# Patient Record
Sex: MALE | Race: WHITE | NOT HISPANIC OR LATINO | Employment: OTHER | URBAN - METROPOLITAN AREA
[De-identification: names, ages, dates, MRNs, and addresses within clinical notes are randomized per-mention and may not be internally consistent; named-entity substitution may affect disease eponyms.]

---

## 2017-02-22 ENCOUNTER — COMMUNICATION - HEALTHEAST (OUTPATIENT)
Dept: INTERNAL MEDICINE | Facility: CLINIC | Age: 82
End: 2017-02-22

## 2017-03-07 ENCOUNTER — COMMUNICATION - HEALTHEAST (OUTPATIENT)
Dept: INTERNAL MEDICINE | Facility: CLINIC | Age: 82
End: 2017-03-07

## 2017-03-07 DIAGNOSIS — E03.9 HYPOTHYROIDISM: ICD-10-CM

## 2017-03-08 ENCOUNTER — OFFICE VISIT - HEALTHEAST (OUTPATIENT)
Dept: INTERNAL MEDICINE | Facility: CLINIC | Age: 82
End: 2017-03-08

## 2017-03-08 DIAGNOSIS — I10 ESSENTIAL HYPERTENSION WITH GOAL BLOOD PRESSURE LESS THAN 140/90: ICD-10-CM

## 2017-03-08 DIAGNOSIS — R05.9 COUGH: ICD-10-CM

## 2017-03-08 DIAGNOSIS — R19.7 DIARRHEA: ICD-10-CM

## 2017-03-13 ENCOUNTER — AMBULATORY - HEALTHEAST (OUTPATIENT)
Dept: INTERNAL MEDICINE | Facility: CLINIC | Age: 82
End: 2017-03-13

## 2017-03-13 ENCOUNTER — COMMUNICATION - HEALTHEAST (OUTPATIENT)
Dept: INTERNAL MEDICINE | Facility: CLINIC | Age: 82
End: 2017-03-13

## 2017-03-31 ENCOUNTER — COMMUNICATION - HEALTHEAST (OUTPATIENT)
Dept: INTERNAL MEDICINE | Facility: CLINIC | Age: 82
End: 2017-03-31

## 2017-07-29 ENCOUNTER — RECORDS - HEALTHEAST (OUTPATIENT)
Dept: ADMINISTRATIVE | Facility: OTHER | Age: 82
End: 2017-07-29

## 2017-09-23 ENCOUNTER — RECORDS - HEALTHEAST (OUTPATIENT)
Dept: ADMINISTRATIVE | Facility: OTHER | Age: 82
End: 2017-09-23

## 2017-11-13 ENCOUNTER — AMBULATORY - HEALTHEAST (OUTPATIENT)
Dept: NURSING | Facility: CLINIC | Age: 82
End: 2017-11-13

## 2017-11-20 ENCOUNTER — OFFICE VISIT - HEALTHEAST (OUTPATIENT)
Dept: INTERNAL MEDICINE | Facility: CLINIC | Age: 82
End: 2017-11-20

## 2017-11-20 DIAGNOSIS — J06.9 URI (UPPER RESPIRATORY INFECTION): ICD-10-CM

## 2017-12-08 ENCOUNTER — RECORDS - HEALTHEAST (OUTPATIENT)
Dept: ADMINISTRATIVE | Facility: OTHER | Age: 82
End: 2017-12-08

## 2018-01-02 ENCOUNTER — RECORDS - HEALTHEAST (OUTPATIENT)
Dept: ADMINISTRATIVE | Facility: OTHER | Age: 83
End: 2018-01-02

## 2018-02-02 ENCOUNTER — RECORDS - HEALTHEAST (OUTPATIENT)
Dept: ADMINISTRATIVE | Facility: OTHER | Age: 83
End: 2018-02-02

## 2018-02-05 ENCOUNTER — RECORDS - HEALTHEAST (OUTPATIENT)
Dept: ADMINISTRATIVE | Facility: OTHER | Age: 83
End: 2018-02-05

## 2018-03-02 ENCOUNTER — COMMUNICATION - HEALTHEAST (OUTPATIENT)
Dept: INTERNAL MEDICINE | Facility: CLINIC | Age: 83
End: 2018-03-02

## 2018-03-02 DIAGNOSIS — E03.9 HYPOTHYROIDISM: ICD-10-CM

## 2018-03-30 ENCOUNTER — RECORDS - HEALTHEAST (OUTPATIENT)
Dept: ADMINISTRATIVE | Facility: OTHER | Age: 83
End: 2018-03-30

## 2018-05-04 ENCOUNTER — COMMUNICATION - HEALTHEAST (OUTPATIENT)
Dept: INTERNAL MEDICINE | Facility: CLINIC | Age: 83
End: 2018-05-04

## 2018-05-04 DIAGNOSIS — E03.9 HYPOTHYROIDISM: ICD-10-CM

## 2018-05-07 ENCOUNTER — COMMUNICATION - HEALTHEAST (OUTPATIENT)
Dept: INTERNAL MEDICINE | Facility: CLINIC | Age: 83
End: 2018-05-07

## 2018-05-20 ENCOUNTER — COMMUNICATION - HEALTHEAST (OUTPATIENT)
Dept: INTERNAL MEDICINE | Facility: CLINIC | Age: 83
End: 2018-05-20

## 2018-05-20 DIAGNOSIS — I25.10 CORONARY ARTERY DISEASE: ICD-10-CM

## 2018-05-20 DIAGNOSIS — I10 ESSENTIAL HYPERTENSION: ICD-10-CM

## 2018-05-20 DIAGNOSIS — I49.01 CARDIAC ARREST WITH VENTRICULAR FIBRILLATION (H): ICD-10-CM

## 2018-05-20 DIAGNOSIS — I46.9 CARDIAC ARREST WITH VENTRICULAR FIBRILLATION (H): ICD-10-CM

## 2018-07-06 ENCOUNTER — COMMUNICATION - HEALTHEAST (OUTPATIENT)
Dept: INTERNAL MEDICINE | Facility: CLINIC | Age: 83
End: 2018-07-06

## 2018-07-06 DIAGNOSIS — E03.9 HYPOTHYROIDISM: ICD-10-CM

## 2018-07-16 ENCOUNTER — OFFICE VISIT (OUTPATIENT)
Dept: FAMILY MEDICINE | Facility: OTHER | Age: 83
End: 2018-07-16
Attending: NURSE PRACTITIONER
Payer: MEDICARE

## 2018-07-16 VITALS
BODY MASS INDEX: 28.47 KG/M2 | SYSTOLIC BLOOD PRESSURE: 140 MMHG | TEMPERATURE: 97.6 F | DIASTOLIC BLOOD PRESSURE: 84 MMHG | WEIGHT: 204 LBS | HEART RATE: 64 BPM

## 2018-07-16 DIAGNOSIS — S51.012A SKIN TEAR OF LEFT ELBOW WITHOUT COMPLICATION, INITIAL ENCOUNTER: Primary | ICD-10-CM

## 2018-07-16 PROCEDURE — G0463 HOSPITAL OUTPT CLINIC VISIT: HCPCS

## 2018-07-16 PROCEDURE — 99202 OFFICE O/P NEW SF 15 MIN: CPT | Performed by: NURSE PRACTITIONER

## 2018-07-16 RX ORDER — METOPROLOL SUCCINATE 25 MG
TABLET, EXTENDED RELEASE 24 HR ORAL
COMMUNITY
Start: 2018-05-20 | End: 2024-01-01

## 2018-07-16 RX ORDER — CLOPIDOGREL BISULFATE 75 MG/1
TABLET ORAL
COMMUNITY
Start: 2017-08-31 | End: 2019-05-21

## 2018-07-16 RX ORDER — ATORVASTATIN CALCIUM 40 MG/1
TABLET, FILM COATED ORAL
COMMUNITY
Start: 2018-06-28 | End: 2021-05-29

## 2018-07-16 NOTE — MR AVS SNAPSHOT
After Visit Summary   7/16/2018    Wilmer Shankar    MRN: [id number]           Patient Information     Date Of Birth          12/22/1933        Visit Information        Provider Department      7/16/2018 7:15 PM Ijeoma Rose NP [facility]        Care Instructions      Skin Tear (Skin Avulsion)  A skin avulsion is a tearing of the top layer of skin. This commonly happens after a fall or other injury. It also tends to be more common in older people, or those taking blood thinners or steroids for long periods of time.  Home care  These guidelines will help you care for your wound at home:    Keep the wound clean and dry for the first 24 to 48 hours, or as your healthcare provider advises.    If there is a dressing or bandage, change it when it gets wet or dirty. Otherwise, leave it on for the first 24 hours, then change it once a day or as often as the doctor says.    If stitches or staples were used, check the wound every day.    After taking off the dressing, wash the area gently with soap and water. Clean as close to the stitches as you can. Avoid washing or rubbing the stitches directly.    After 3 days you can keep the bandages off the wound, unless told otherwise, or there is continued drainage.  Allow the wound to be open to the air.    Keep a thin layer of antibiotic ointment on the cut. This will keep the wound clean, make it easier to remove the stitches, and reduce scarring.    If your wound is oozing, you can put a nonstick dressing over it. Then, reapply the bandage or dressing as you were told.    You can shower as usual after the first 24 hours, but don't soak the area in water (no baths or swimming) until the stitches or staples are taken out.    If surgical tape was used, keep the area clean and dry. If it becomes wet, blot it dry with a clean towel.    If skin glue was used, don't put any creams, lotions, or antibiotic ointments on it. These can dissolve the  glue. Usually the glue will flake off in about 5 to 10 days by itself. Try to resist picking it off before that so the wound doesn't open up. When it gets wet, pat it dry.  Here is some information about medicine:    You may use over-the-counter medicine such as acetaminophen or ibuprofen to control pain, unless another pain medicine was given. If you have chronic liver or kidney disease or ever had a stomach ulcer or gastrointestinal bleeding, talk with your doctor before using these medicines.    If you were given antibiotics, take them until they are all used up. It is important to finish the antibiotics even if the wound looks better. This will ensure that the infection has cleared.  Follow-up care  Follow up with your healthcare provider, or as advised.    Watch for any signs of infection, such as increasing redness, swelling, or pus coming out. If this happens, don't wait for your scheduled visit. Instead, see a doctor sooner.    Stitches or staples are usually taken out within 5 to 14 days. This varies depending on what part of your body they are on, and the type of wound. The doctor will tell you how long stitches should be left in.     If surgical tape was used, it is usually left on for 7 to 10 days. You can remove surgical tape after that unless you were told otherwise. If you try to remove it, and it is too hard, soaking can help. Surgical tape strips will eventually fall off on their own. If the edges of the cut pull apart, stop removing the tape or strips and follow up with your doctor    As mentioned above, skin glue will flake off by itself in 5 to 10 days, so you don't need to pull it off.  If any X-rays were done, you will be notified of any changes that may affect your care.  When to seek medical advice  Call your healthcare provider right away if any of these occur:    Increasing pain in the wound    Redness, swelling, or pus coming from the wound    Fever of 100.4 F (38 C) or higher, or as  "directed by your healthcare provider    Sutures or staples come apart or fall out before your next appointment and the wound edges look as if they will re-open    Surgical tape closures fall off before 7 days, and the wound edges look as if they will re-open    Bleeding not controlled by direct pressure                  Follow-ups after your visit        Who to contact     If you have questions or need follow up information about today's clinic visit or your schedule please contact [facility] directly at [phone].  Normal or non-critical lab and imaging results will be communicated to you by [employer], letter or phone within 4 business days after the clinic has received the results. If you do not hear from us within 7 days, please contact the clinic through Share[employer] or phone. If you have a critical or abnormal lab result, we will notify you by phone as soon as possible.  Submit refill requests through [employer] or call your pharmacy and they will forward the refill request to us. Please allow 3 business days for your refill to be completed.          Additional Information About Your Visit        [employer] Information     [employer] lets you send messages to your doctor, view your test results, renew your prescriptions, schedule appointments and more. To sign up, go to [email or web address] . Click on \"Log in\" on the left side of the screen, which will take you to the Welcome page. Then click on \"Sign up Now\" on the right side of the page.     You will be asked to enter the access code listed below, as well as some personal information. Please follow the directions to create your username and password.     Your access code is: [email or web address]  Expires: 10/14/2018  8:34 PM     Your access code will  in 90 days. If you need help or a new code, please call your [place] clinic or [phone].        Care EveryWhere ID     This is your Care EveryWhere ID. This could be used by other organizations to " access your [place] medical records  [id number]        Your Vitals Were     Pulse Temperature BMI (Body Mass Index)             64 97.6  F (36.4  C) (Tympanic) 28.47 kg/m2          Blood Pressure from Last 3 Encounters:   07/16/18 140/84   05/25/12 128/71    Weight from Last 3 Encounters:   07/16/18 204 lb (92.5 kg)   10/25/16 196 lb (88.9 kg)   09/28/16 196 lb (88.9 kg)              Today, you had the following     No orders found for display       Primary Care Provider Office Phone # Fax #    Reji Plata [phone]       [address]   [place] [address]        Equal Access to Services     AZRA ZHU : Hadii aad ku hadasho Soomaali, waaxda luqadaha, qaybta kaalmada adeegyada, waxay yovani garcia. So [facility] [phone].    ATENCIÓN: Si habla español, tiene a luna disposición servicios gratuitos de asistencia lingüística. Ll[facility] [phone].    We comply with applicable federal civil rights laws and [place] laws. We do not discriminate on the basis of race, color, national origin, age, disability, sex, sexual orientation, or gender identity.            Thank you!     Thank you for choosing [facility]  for your care. Our goal is always to provide you with excellent care. Hearing back from our patients is one way we can continue to improve our services. Please take a few minutes to complete the written survey that you may receive in the mail after your visit with us. Thank you!             Your Updated Medication List - Protect others around you: Learn how to safely use, store and throw away your medicines at www.disposemymeds.org.          This list is accurate as of 7/16/18  8:34 PM.  Always use your most recent med list.                   Brand Name Dispense Instructions for use Diagnosis    ALLEGRA PO      Take  by mouth.        ASPIRIN PO      Take  by mouth.        atorvastatin 40 MG tablet    LIPITOR          calcium  carbonate-vitamin D 600-400 MG-UNIT Chew      Take  by mouth.        clopidogrel 75 MG tablet    PLAVIX          LEVOTHYROXINE SODIUM PO      Take  by mouth.        lisinopril-hydrochlorothiazide 10-12.5 MG per tablet    PRINZIDE/ZESTORETIC     Take 1 tablet by mouth daily.        SIMVASTATIN PO      Take  by mouth.        TOPROL XL 25 MG 24 hr tablet   Generic drug:  metoprolol succinate           VIAGRA PO      Take  by mouth.        VITAMIN C PO      Take  by mouth.        VITAMIN D3 PO      Take 5,000 Units by mouth daily.

## 2018-07-17 NOTE — NURSING NOTE
Patient presents to the clinic for scrape on left arm. Patient reports falling on cement steps last night. Patient's wife reports his left knee is bothering him as well. Patient states he has been seeing Dr. Celaya for his knee.  Suzette JACKSON CMA.......7/16/2018..8:00 PM

## 2018-07-17 NOTE — PATIENT INSTRUCTIONS
Skin Tear (Skin Avulsion)  A skin avulsion is a tearing of the top layer of skin. This commonly happens after a fall or other injury. It also tends to be more common in older people, or those taking blood thinners or steroids for long periods of time.  Home care  These guidelines will help you care for your wound at home:    Keep the wound clean and dry for the first 24 to 48 hours, or as your healthcare provider advises.    If there is a dressing or bandage, change it when it gets wet or dirty. Otherwise, leave it on for the first 24 hours, then change it once a day or as often as the doctor says.    If stitches or staples were used, check the wound every day.    After taking off the dressing, wash the area gently with soap and water. Clean as close to the stitches as you can. Avoid washing or rubbing the stitches directly.    After 3 days you can keep the bandages off the wound, unless told otherwise, or there is continued drainage.  Allow the wound to be open to the air.    Keep a thin layer of antibiotic ointment on the cut. This will keep the wound clean, make it easier to remove the stitches, and reduce scarring.    If your wound is oozing, you can put a nonstick dressing over it. Then, reapply the bandage or dressing as you were told.    You can shower as usual after the first 24 hours, but don't soak the area in water (no baths or swimming) until the stitches or staples are taken out.    If surgical tape was used, keep the area clean and dry. If it becomes wet, blot it dry with a clean towel.    If skin glue was used, don't put any creams, lotions, or antibiotic ointments on it. These can dissolve the glue. Usually the glue will flake off in about 5 to 10 days by itself. Try to resist picking it off before that so the wound doesn't open up. When it gets wet, pat it dry.  Here is some information about medicine:    You may use over-the-counter medicine such as acetaminophen or ibuprofen to control pain,  unless another pain medicine was given. If you have chronic liver or kidney disease or ever had a stomach ulcer or gastrointestinal bleeding, talk with your doctor before using these medicines.    If you were given antibiotics, take them until they are all used up. It is important to finish the antibiotics even if the wound looks better. This will ensure that the infection has cleared.  Follow-up care  Follow up with your healthcare provider, or as advised.    Watch for any signs of infection, such as increasing redness, swelling, or pus coming out. If this happens, don't wait for your scheduled visit. Instead, see a doctor sooner.    Stitches or staples are usually taken out within 5 to 14 days. This varies depending on what part of your body they are on, and the type of wound. The doctor will tell you how long stitches should be left in.     If surgical tape was used, it is usually left on for 7 to 10 days. You can remove surgical tape after that unless you were told otherwise. If you try to remove it, and it is too hard, soaking can help. Surgical tape strips will eventually fall off on their own. If the edges of the cut pull apart, stop removing the tape or strips and follow up with your doctor    As mentioned above, skin glue will flake off by itself in 5 to 10 days, so you don't need to pull it off.  If any X-rays were done, you will be notified of any changes that may affect your care.  When to seek medical advice  Call your healthcare provider right away if any of these occur:    Increasing pain in the wound    Redness, swelling, or pus coming from the wound    Fever of 100.4 F (38 C) or higher, or as directed by your healthcare provider    Sutures or staples come apart or fall out before your next appointment and the wound edges look as if they will re-open    Surgical tape closures fall off before 7 days, and the wound edges look as if they will re-open    Bleeding not controlled by direct  pressure

## 2018-07-17 NOTE — PROGRESS NOTES
Nursing Notes:   Suzette Kelly CMA  7/16/2018  8:02 PM  Unsigned  Patient presents to the clinic for scrape on left arm. Patient reports falling on cement steps last night. Patient's wife reports his left knee is bothering him as well. Patient states he has been seeing Dr. Celaya for his knee.  Suzette JACKSON CMA.......7/16/2018..8:00 PM      SUBJECTIVE:   Wilmer Shankar is a 84 year old male who presents to clinic today for the following health issues:    Skin tear:       Duration: Last night    Description (location/character/radiation): scrap, fell and scrapped LT arm    Intensity:  mild    Accompanying signs and symptoms: No other concerns.     History (similar episodes/previous evaluation): None    Precipitating or alleviating factors: washed it and dressed it    Therapies tried and outcome: Cleaned it at home.        Problem list and histories reviewed & adjusted, as indicated.  Additional history: as documented    Current Outpatient Prescriptions   Medication Sig Dispense Refill     Ascorbic Acid (VITAMIN C PO) Take  by mouth.       ASPIRIN PO Take  by mouth.       atorvastatin (LIPITOR) 40 MG tablet        calcium carbonate-vitamin D 600-400 MG-UNIT CHEW Take  by mouth.       Cholecalciferol (VITAMIN D3 PO) Take 5,000 Units by mouth daily.       clopidogrel (PLAVIX) 75 MG tablet        Fexofenadine HCl (ALLEGRA PO) Take  by mouth.       LEVOTHYROXINE SODIUM PO Take  by mouth.       lisinopril-hydrochlorothiazide (PRINZIDE,ZESTORETIC) 10-12.5 MG per tablet Take 1 tablet by mouth daily.       Sildenafil Citrate (VIAGRA PO) Take  by mouth.       SIMVASTATIN PO Take  by mouth.       TOPROL XL 25 MG 24 hr tablet        No Known Allergies      ROS:  Notable findings in the HPI.       OBJECTIVE:     /84 (BP Location: Right arm, Patient Position: Sitting, Cuff Size: Adult Regular)  Pulse 64  Temp 97.6  F (36.4  C) (Tympanic)  Wt 204 lb (92.5 kg)  BMI 28.47 kg/m2  Body mass index is 28.47 kg/(m^2).  GENERAL:  healthy, alert and no distress  RESP: with ease  SKIN: 3 cm by 2 cm skin tear noted on the LT elbow. This is cleaned and skin approximated again. Dressing applied. ROM of elbow intact.   Wife wanted his knee looked at, he did not want it looked at.     Diagnostic Test Results:  none     ASSESSMENT/PLAN:     1. Skin tear of left elbow without complication, initial encounter      PLAN:  Dressing applied, leave on for 24 hours and then change dressings as needed. Home cares and otc gone over.       Followup:    If not improving or if condition worsens, follow up with your Primary Care Provider    Disclaimer:  This note consists of words and symbols derived from keyboarding, dictation, or using voice recognition software. As a result, there may be errors in the script that have gone undetected. Please consider this when interpreting information found in this note.      Ijeoma Rose NP, 7/16/2018 8:05 PM

## 2018-07-18 ENCOUNTER — RECORDS - HEALTHEAST (OUTPATIENT)
Dept: ADMINISTRATIVE | Facility: OTHER | Age: 83
End: 2018-07-18

## 2018-07-18 ENCOUNTER — HOSPITAL ENCOUNTER (EMERGENCY)
Facility: OTHER | Age: 83
Discharge: HOME | End: 2018-07-18
Attending: FAMILY MEDICINE | Admitting: FAMILY MEDICINE
Payer: MEDICARE

## 2018-07-18 VITALS
HEART RATE: 60 BPM | BODY MASS INDEX: 28.56 KG/M2 | TEMPERATURE: 98.4 F | WEIGHT: 204 LBS | RESPIRATION RATE: 14 BRPM | DIASTOLIC BLOOD PRESSURE: 93 MMHG | OXYGEN SATURATION: 93 % | SYSTOLIC BLOOD PRESSURE: 163 MMHG | HEIGHT: 71 IN

## 2018-07-18 DIAGNOSIS — I10 HYPERTENSION, UNSPECIFIED TYPE: ICD-10-CM

## 2018-07-18 LAB
ANION GAP SERPL CALCULATED.3IONS-SCNC: 10 MMOL/L (ref 3–14)
BUN SERPL-MCNC: 28 MG/DL (ref 7–25)
CALCIUM SERPL-MCNC: 9.2 MG/DL (ref 8.6–10.3)
CHLORIDE SERPL-SCNC: 111 MMOL/L (ref 98–107)
CO2 SERPL-SCNC: 20 MMOL/L (ref 21–31)
CREAT SERPL-MCNC: 1.6 MG/DL (ref 0.7–1.3)
GFR SERPL CREATININE-BSD FRML MDRD: 41 ML/MIN/1.7M2
GLUCOSE SERPL-MCNC: 98 MG/DL (ref 70–105)
POTASSIUM SERPL-SCNC: 4.1 MMOL/L (ref 3.5–5.1)
SODIUM SERPL-SCNC: 141 MMOL/L (ref 134–144)
TROPONIN I SERPL-MCNC: <0.03 UG/L (ref 0–0.03)

## 2018-07-18 PROCEDURE — 36415 COLL VENOUS BLD VENIPUNCTURE: CPT | Performed by: FAMILY MEDICINE

## 2018-07-18 PROCEDURE — 93010 ELECTROCARDIOGRAM REPORT: CPT | Performed by: INTERNAL MEDICINE

## 2018-07-18 PROCEDURE — 80048 BASIC METABOLIC PNL TOTAL CA: CPT | Performed by: FAMILY MEDICINE

## 2018-07-18 PROCEDURE — 99284 EMERGENCY DEPT VISIT MOD MDM: CPT | Mod: 25 | Performed by: FAMILY MEDICINE

## 2018-07-18 PROCEDURE — 93005 ELECTROCARDIOGRAM TRACING: CPT | Performed by: FAMILY MEDICINE

## 2018-07-18 PROCEDURE — 84484 ASSAY OF TROPONIN QUANT: CPT | Performed by: FAMILY MEDICINE

## 2018-07-18 PROCEDURE — 99284 EMERGENCY DEPT VISIT MOD MDM: CPT | Mod: Z6 | Performed by: FAMILY MEDICINE

## 2018-07-18 NOTE — DISCHARGE INSTRUCTIONS
Taking Your Blood Pressure  Blood pressure is the force of blood against the artery wall as it moves from the heart through the blood vessels. You can take your own blood pressure reading using a digital monitor. Take your readings the same each time, using the same arm. Take readings as often as your healthcare provider instructs.  About blood pressure monitors  Blood pressure monitors are designed for certain ages and cases. You can find monitors for older adults, for pregnant women, and for children. Make sure the one you choose is the right one for your age and situation.  The American Heart Association recommends an automatic cuff monitor that fits on your upper arm (bicep). The cuff should fit your arm size. A cuff that s too large or too small will not give an accurate reading. Measure around your upper arm to find your size.  Monitors that attach to your finger or wrist are not as accurate as monitors for your upper arm.  Ask your healthcare provider for help in choosing a monitor. Bring your monitor to your next provider visit if you need help in using it the correct way.  The steps below are general instructions for using an automatic digital monitor.  Step 1. Relax      Take your blood pressure at the same time every day, such as in the morning or evening, or at the time your healthcare provider recommends.    Wait at least a half-hour after smoking, eating, or exercising. Don't drink coffee, tea, soda, or other caffeinated beverages before checking your blood pressure.    Sit comfortably at a table with both feet on the floor. Do not cross your legs or feet. Place the monitor near you.    Rest for a few minutes before you begin.  Step 2. Wrap the cuff      Place your arm on the table, palm up. Your arm should be at the level of your heart. Wrap the cuff around your upper arm, just above your elbow. It s best done on bare skin, not over clothing. Most cuffs will indicate where the brachial artery (the  blood vessel in the middle of the arm at the inner side of the elbow) should line up with the cuff. Look in your monitor's instruction booklet for an illustration. You can also bring your cuff to your healthcare provider and have them show you how to correctly place the cuff.  Step 3. Inflate the cuff      Push the button that starts the pump.    The cuff will tighten, then loosen.    The numbers will change. When they stop changing, your blood pressure reading will appear.    Take 2 or 3 readings one minute apart.  Step 4. Write down the results of each reading      Write down your blood pressure numbers for each reading. Note the date and time. Keep your results in one place, such as a notebook. Even if your monitor has a built-in memory, keep a hard copy of the readings.    Remove the cuff from your arm. Turn off the machine.    Bring your blood pressure records with your healthcare providers at each visit.    If you start a new blood pressure medicine, note the day you started the new medicine. Also note the day if you change the dose of your medicine. This information goes on your blood pressure recording sheet. This will help your healthcare provider monitor how well the medicine changes are working.    Ask your healthcare provider what numbers should prompt you to call him or her. Also ask what numbers should prompt you to get help right away.  Date Last Reviewed: 11/1/2016 2000-2017 [employer]. [address] [place] [address], [place] [address]. All rights reserved. This information is not intended as a substitute for professional medical care. Always follow your healthcare professional's instructions.

## 2018-07-18 NOTE — ED PROVIDER NOTES
History   No chief complaint on file.    HPI  Wilmer Shankar is a 84 year old male who has had some elevated blood pressures at home over the past couple of days, at times over 200 systolic.  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  I called his cardiology clinic and they state his pacemaker is set at 30 on the low end, thus this is why it is not pacing here with pulses in the low 40s.  Mostly, though his pulse is in the 50s.      He describes feeling slightly dizzy one time this am.  It was associated with head movements, and was spinning in nature.  Only lasted a few seconds.    Not associated with palpitations or chest symptoms.    He is entirely asymptomatic at this time and for most of the day.      Problem List:    There are no active problems to display for this patient.       Past Medical History:    Past Medical History:   Diagnosis Date     High blood cholesterol level      Hypertension      Thyroid disease        Past Surgical History:    Past Surgical History:   Procedure Laterality Date     HERNIA REPAIR       ORTHOPEDIC SURGERY      right knee replacement       Family History:    Family History   Problem Relation Age of Onset     Cancer - colorectal Brother        Social History:  Marital Status:   [2]  Social History   Substance Use Topics     Smoking status: Former Smoker     Smokeless tobacco: Never Used     Alcohol use Yes      Comment: modeerate        Medications:      ASPIRIN PO   atorvastatin (LIPITOR) 40 MG tablet   LEVOTHYROXINE SODIUM PO   lisinopril-hydrochlorothiazide (PRINZIDE,ZESTORETIC) 10-12.5 MG per tablet   SIMVASTATIN PO   TOPROL XL 25 MG 24 hr tablet   Ascorbic Acid (VITAMIN C PO)   calcium carbonate-vitamin D 600-400 MG-UNIT CHEW   Cholecalciferol (VITAMIN D3 PO)   clopidogrel (PLAVIX) 75 MG tablet   Fexofenadine HCl (ALLEGRA PO)   Sildenafil Citrate (VIAGRA PO)         Review of Systemsno  "fevers, chills, HEENT, chest pain or pressure, SOB, abdominal concerns, N/V/D.    Physical Exam   BP: 143/83  Pulse: 60  Temp: 98.4  F (36.9  C)  Resp: 16  Height: 180.3 cm (5' 11\")  Weight: 92.5 kg (204 lb)  SpO2: 97 %      Physical Examwell man.  Stable vitals.  Pulses in the 40s and 50s, but he is asymptomatic and not subjectively orthostatic.  PERRL.  EOMI.  CN intact and tested.  No obvious one-sided neurologic deficits.  TMs clear.  Heart reg.  No murmur, rub, gallop.  Lungs clear.  abd soft, NT.    ED Course     ED Course   his labs are reassuring.  Cr is at its baseline of around 1.5    EKG shows normal sinus with RBBB, and no concerning interval changes  Procedures               Critical Care time:  none               Results for orders placed or performed during the hospital encounter of 07/18/18 (from the past 24 hour(s))   Troponin I   Result Value Ref Range    Troponin I ES <0.030 0.000 - 0.034 ug/L   Basic metabolic panel   Result Value Ref Range    Sodium 141 134 - 144 mmol/L    Potassium 4.1 3.5 - 5.1 mmol/L    Chloride 111 (H) 98 - 107 mmol/L    Carbon Dioxide 20 (L) 21 - 31 mmol/L    Anion Gap 10 3 - 14 mmol/L    Glucose 98 70 - 105 mg/dL    Urea Nitrogen 28 (H) 7 - 25 mg/dL    Creatinine 1.60 (H) 0.70 - 1.30 mg/dL    GFR Estimate 41 (L) >60 mL/min/1.7m2    GFR Estimate If Black 50 (L) >60 mL/min/1.7m2    Calcium 9.2 8.6 - 10.3 mg/dL       Medications - No data to display    Assessments & Plan (with Medical Decision Making)     I have reviewed the nursing notes.    I have reviewed the findings, diagnosis, plan and need for follow up with the patient.   the patient is asymptomatic, has stable BPs, and no evidence of end-organ damage.  Would recommend he talk to his primary tomorrow for ER follow up and take daily BPs at home.    New Prescriptions    No medications on file       Final diagnoses:   Hypertension, unspecified type       7/18/2018   [facility]     Rishabh Talley " MD [facility]  07/18/18 [id number]

## 2018-07-18 NOTE — ED AVS SNAPSHOT
[facility]    [address]    [place] [address]    Phone:  [phone]    Fax:  [phone]                                       Wilmer Shankar   MRN: [id number]    Department:  [facility]   Date of Visit:  7/18/2018           After Visit Summary Signature Page     I have received my discharge instructions, and my questions have been answered. I have discussed any challenges I see with this plan with the nurse or doctor.    ..........................................................................................................................................  Patient/Patient Representative Signature      ..........................................................................................................................................  Patient Representative Print Name and Relationship to Patient    ..................................................               ................................................  Date                                            Time    ..........................................................................................................................................  Reviewed by Signature/Title    ...................................................              ..............................................  Date                                                            Time

## 2018-07-18 NOTE — ED TRIAGE NOTES
Pt has history of fall for a few days ago. Pt fell today from extreme dizziness and felt like the room was spinning. Pt's BP was high into the 200's at home. Pt's BP is normal 120's. Pt bumped his head no LOC. Pt was on plavix but states he is no longer on it.    Christina Guerrero RN on 7/18/2018 at 12:18 PM

## 2018-07-19 ENCOUNTER — COMMUNICATION - HEALTHEAST (OUTPATIENT)
Dept: SCHEDULING | Facility: CLINIC | Age: 83
End: 2018-07-19

## 2018-09-10 ENCOUNTER — COMMUNICATION - HEALTHEAST (OUTPATIENT)
Dept: INTERNAL MEDICINE | Facility: CLINIC | Age: 83
End: 2018-09-10

## 2018-09-10 DIAGNOSIS — E03.9 HYPOTHYROIDISM: ICD-10-CM

## 2018-09-11 ENCOUNTER — COMMUNICATION - HEALTHEAST (OUTPATIENT)
Dept: SCHEDULING | Facility: CLINIC | Age: 83
End: 2018-09-11

## 2018-09-11 DIAGNOSIS — E03.9 HYPOTHYROIDISM: ICD-10-CM

## 2018-11-13 ENCOUNTER — RECORDS - HEALTHEAST (OUTPATIENT)
Dept: ADMINISTRATIVE | Facility: OTHER | Age: 83
End: 2018-11-13

## 2018-11-26 ENCOUNTER — RECORDS - HEALTHEAST (OUTPATIENT)
Dept: ADMINISTRATIVE | Facility: OTHER | Age: 83
End: 2018-11-26

## 2018-11-30 ENCOUNTER — RECORDS - HEALTHEAST (OUTPATIENT)
Dept: LAB | Facility: CLINIC | Age: 83
End: 2018-11-30

## 2018-12-03 LAB
ANION GAP SERPL CALCULATED.3IONS-SCNC: 11 MMOL/L (ref 5–18)
BUN SERPL-MCNC: 25 MG/DL (ref 8–28)
CALCIUM SERPL-MCNC: 8.9 MG/DL (ref 8.5–10.5)
CHLORIDE BLD-SCNC: 116 MMOL/L (ref 98–107)
CO2 SERPL-SCNC: 15 MMOL/L (ref 22–31)
CREAT SERPL-MCNC: 1.62 MG/DL (ref 0.7–1.3)
GFR SERPL CREATININE-BSD FRML MDRD: 41 ML/MIN/1.73M2
GLUCOSE BLD-MCNC: 64 MG/DL (ref 70–125)
POTASSIUM BLD-SCNC: 4.2 MMOL/L (ref 3.5–5)
SODIUM SERPL-SCNC: 142 MMOL/L (ref 136–145)

## 2018-12-10 ENCOUNTER — RECORDS - HEALTHEAST (OUTPATIENT)
Dept: LAB | Facility: CLINIC | Age: 83
End: 2018-12-10

## 2018-12-10 LAB
ANION GAP SERPL CALCULATED.3IONS-SCNC: 8 MMOL/L (ref 5–18)
BUN SERPL-MCNC: 25 MG/DL (ref 8–28)
CALCIUM SERPL-MCNC: 8.8 MG/DL (ref 8.5–10.5)
CHLORIDE BLD-SCNC: 114 MMOL/L (ref 98–107)
CO2 SERPL-SCNC: 20 MMOL/L (ref 22–31)
CREAT SERPL-MCNC: 1.62 MG/DL (ref 0.7–1.3)
GFR SERPL CREATININE-BSD FRML MDRD: 41 ML/MIN/1.73M2
GLUCOSE BLD-MCNC: 66 MG/DL (ref 70–125)
POTASSIUM BLD-SCNC: 4.5 MMOL/L (ref 3.5–5)
SODIUM SERPL-SCNC: 142 MMOL/L (ref 136–145)

## 2019-05-21 ENCOUNTER — OFFICE VISIT (OUTPATIENT)
Dept: FAMILY MEDICINE | Facility: OTHER | Age: 84
End: 2019-05-21
Attending: NURSE PRACTITIONER
Payer: MEDICARE

## 2019-05-21 VITALS
BODY MASS INDEX: 27.68 KG/M2 | HEART RATE: 61 BPM | HEIGHT: 71 IN | TEMPERATURE: 97.2 F | SYSTOLIC BLOOD PRESSURE: 122 MMHG | OXYGEN SATURATION: 97 % | WEIGHT: 197.7 LBS | RESPIRATION RATE: 18 BRPM | DIASTOLIC BLOOD PRESSURE: 76 MMHG

## 2019-05-21 DIAGNOSIS — D45 POLYCYTHEMIA VERA (H): Primary | ICD-10-CM

## 2019-05-21 DIAGNOSIS — D45 POLYCYTHEMIA VERA (H): ICD-10-CM

## 2019-05-21 LAB
BASOPHILS # BLD AUTO: 0.4 10E9/L (ref 0–0.2)
BASOPHILS NFR BLD AUTO: 1.7 %
DIFFERENTIAL METHOD BLD: ABNORMAL
EOSINOPHIL # BLD AUTO: 1.1 10E9/L (ref 0–0.7)
EOSINOPHIL NFR BLD AUTO: 4.5 %
ERYTHROCYTE [DISTWIDTH] IN BLOOD BY AUTOMATED COUNT: 21.1 % (ref 10–15)
HCT VFR BLD AUTO: 43.2 % (ref 40–53)
HGB BLD-MCNC: 12.1 G/DL (ref 13.3–17.7)
IMM GRANULOCYTES # BLD: 0.6 10E9/L (ref 0–0.4)
IMM GRANULOCYTES NFR BLD: 2.7 %
LYMPHOCYTES # BLD AUTO: 1.9 10E9/L (ref 0.8–5.3)
LYMPHOCYTES NFR BLD AUTO: 8.2 %
MCH RBC QN AUTO: 20.6 PG (ref 26.5–33)
MCHC RBC AUTO-ENTMCNC: 28 G/DL (ref 31.5–36.5)
MCV RBC AUTO: 74 FL (ref 78–100)
MONOCYTES # BLD AUTO: 0.9 10E9/L (ref 0–1.3)
MONOCYTES NFR BLD AUTO: 4 %
NEUTROPHILS # BLD AUTO: 18.5 10E9/L (ref 1.6–8.3)
NEUTROPHILS NFR BLD AUTO: 78.9 %
PLATELET # BLD AUTO: 531 10E9/L (ref 150–450)
RBC # BLD AUTO: 5.87 10E12/L (ref 4.4–5.9)
WBC # BLD AUTO: 23.4 10E9/L (ref 4–11)

## 2019-05-21 PROCEDURE — G0463 HOSPITAL OUTPT CLINIC VISIT: HCPCS

## 2019-05-21 PROCEDURE — 99202 OFFICE O/P NEW SF 15 MIN: CPT | Performed by: PHYSICIAN ASSISTANT

## 2019-05-21 PROCEDURE — 85025 COMPLETE CBC W/AUTO DIFF WBC: CPT | Mod: ZL | Performed by: PHYSICIAN ASSISTANT

## 2019-05-21 PROCEDURE — 36415 COLL VENOUS BLD VENIPUNCTURE: CPT | Mod: ZL | Performed by: PHYSICIAN ASSISTANT

## 2019-05-21 RX ORDER — ALENDRONATE SODIUM 70 MG/1
35 TABLET ORAL
COMMUNITY
Start: 2019-04-09 | End: 2024-01-01

## 2019-05-21 ASSESSMENT — PAIN SCALES - GENERAL: PAINLEVEL: MODERATE PAIN (5)

## 2019-05-21 ASSESSMENT — MIFFLIN-ST. JEOR: SCORE: 1603.89

## 2019-05-21 NOTE — NURSING NOTE
Patient presents to the clinic to check a CBC due to known dx of hypogammaglobulinema.     Medication Reconciliation: complete   Desire Diaz LPN............. May 21, 2019 1:43 PM

## 2019-05-21 NOTE — PROGRESS NOTES
"HPI:    Wilmer Shankar is a 85 year old male who presents to clinic today for lab result. He has a history of polycythemia vera. He follows with [facility], Dr. AMADOU Vazquez, hematology. [place] a cabin in this area and just needed a CBC to check his hematocrit status today.     Once results are obtained, we will call to Dr. Vazquez and he will further manage patient's case. If hematocrit is > 45 he is supposed to have blood removed.     Patient is feeling well, no fever, no weakness or change in symptoms    There is no problem list on file for this patient.    Past Medical History:   Diagnosis Date     High blood cholesterol level      Hypertension      high cholesterol     Thyroid disease        Current Outpatient Medications   Medication Sig Dispense Refill     alendronate (FOSAMAX) 70 MG tablet Take 35 mg by mouth       Ascorbic Acid (VITAMIN C PO) Take  by mouth.       ASPIRIN PO Take  by mouth.       atorvastatin (LIPITOR) 40 MG tablet        calcium carbonate-vitamin D 600-400 MG-UNIT CHEW Take  by mouth.       Cholecalciferol (VITAMIN D3 PO) Take 5,000 Units by mouth daily.       Fexofenadine HCl (ALLEGRA PO) Take  by mouth.       LEVOTHYROXINE SODIUM PO Take  by mouth.       Sildenafil Citrate (VIAGRA PO) Take  by mouth.       TOPROL XL 25 MG 24 hr tablet          No Known Allergies    ROS:  General: no fever, otherwise is feeling well  HENT: negative  Respiratory: negative    EXAM:  Vitals:    05/21/19 1346   BP: 122/76   BP Location: Right arm   Patient Position: Sitting   Cuff Size: Adult Regular   Pulse: 61   Resp: 18   Temp: 97.2  F (36.2  C)   TempSrc: Tympanic   SpO2: 97%   Weight: 89.7 kg (197 lb 11.2 oz)   Height: 1.803 m (5' 11\")     General appearance: well appearing male, in no acute distress  Head: normocephalic, atraumatic  Respiratory: clear to auscultation bilaterally  Cardiac: RRR with no murmurs  Dermatological: no rashes or lesions  Psychological: normal affect, alert and " pleasant    CBC completed. Hematocrit 43.1    ASSESSMENT AND PLAN:    1. Polycythemia vera (H)      CBC request today for polycythema vera  Patient requesting to be drawn and called with results  hematocrit was 43.1. Patient notified through nursing pool, will also obtain CR so information can be sent to Dr. Taylor Kelly PA-C on 5/22/2019 at 2:43 PM

## 2019-05-22 ENCOUNTER — TELEPHONE (OUTPATIENT)
Dept: FAMILY MEDICINE | Facility: OTHER | Age: 84
End: 2019-05-22

## 2019-05-22 NOTE — TELEPHONE ENCOUNTER
Wife of patient (Rakel) called with morning looking for lab results for her . He had an outpatient blood draw in urgent care yesterday (5/21/19) with Belkis Kelly PA-C. They were told they would received results within 10 minutes but did not receive them and did not get a phone call or any msgs.  Mr. Malone's PCP is Dr Taylor Ramires with [facility] at [facility] in [facility].  They would like this info asap as they need to make arrangements for travel to the [facility] if necessary. Please contact Rakel with results asap. Thank you

## 2019-06-05 ENCOUNTER — MEDICAL CORRESPONDENCE (OUTPATIENT)
Dept: LAB | Facility: OTHER | Age: 84
End: 2019-06-05

## 2019-06-05 DIAGNOSIS — D45 POLYCYTHEMIA VERA (H): Primary | ICD-10-CM

## 2019-06-05 LAB
BASOPHILS # BLD AUTO: 0.4 10E9/L (ref 0–0.2)
BASOPHILS NFR BLD AUTO: 1.7 %
DIFFERENTIAL METHOD BLD: ABNORMAL
EOSINOPHIL # BLD AUTO: 1.1 10E9/L (ref 0–0.7)
EOSINOPHIL NFR BLD AUTO: 4.8 %
ERYTHROCYTE [DISTWIDTH] IN BLOOD BY AUTOMATED COUNT: 21.3 % (ref 10–15)
HCT VFR BLD AUTO: 48.4 % (ref 40–53)
HGB BLD-MCNC: 13.3 G/DL (ref 13.3–17.7)
IMM GRANULOCYTES # BLD: 0.6 10E9/L (ref 0–0.4)
IMM GRANULOCYTES NFR BLD: 2.4 %
LYMPHOCYTES # BLD AUTO: 1.8 10E9/L (ref 0.8–5.3)
LYMPHOCYTES NFR BLD AUTO: 7.5 %
MCH RBC QN AUTO: 20.5 PG (ref 26.5–33)
MCHC RBC AUTO-ENTMCNC: 27.5 G/DL (ref 31.5–36.5)
MCV RBC AUTO: 75 FL (ref 78–100)
MONOCYTES # BLD AUTO: 0.8 10E9/L (ref 0–1.3)
MONOCYTES NFR BLD AUTO: 3.6 %
NEUTROPHILS # BLD AUTO: 18.6 10E9/L (ref 1.6–8.3)
NEUTROPHILS NFR BLD AUTO: 80 %
PLATELET # BLD AUTO: 488 10E9/L (ref 150–450)
RBC # BLD AUTO: 6.49 10E12/L (ref 4.4–5.9)
WBC # BLD AUTO: 23.2 10E9/L (ref 4–11)

## 2019-06-05 PROCEDURE — 85025 COMPLETE CBC W/AUTO DIFF WBC: CPT

## 2019-06-05 PROCEDURE — 36415 COLL VENOUS BLD VENIPUNCTURE: CPT

## 2019-06-07 DIAGNOSIS — D45 POLYCYTHEMIA VERA (H): ICD-10-CM

## 2019-06-07 LAB — BLOOD COLLECTION: NORMAL

## 2019-06-18 DIAGNOSIS — D45 POLYCYTHEMIA VERA (H): ICD-10-CM

## 2019-06-18 LAB
BASOPHILS # BLD AUTO: 0.4 10E9/L (ref 0–0.2)
BASOPHILS NFR BLD AUTO: 1.7 %
DIFFERENTIAL METHOD BLD: ABNORMAL
EOSINOPHIL # BLD AUTO: 1 10E9/L (ref 0–0.7)
EOSINOPHIL NFR BLD AUTO: 4.7 %
ERYTHROCYTE [DISTWIDTH] IN BLOOD BY AUTOMATED COUNT: 20.6 % (ref 10–15)
HCT VFR BLD AUTO: 44.5 % (ref 40–53)
HGB BLD-MCNC: 12 G/DL (ref 13.3–17.7)
IMM GRANULOCYTES # BLD: 0.6 10E9/L (ref 0–0.4)
IMM GRANULOCYTES NFR BLD: 2.8 %
LYMPHOCYTES # BLD AUTO: 1.6 10E9/L (ref 0.8–5.3)
LYMPHOCYTES NFR BLD AUTO: 7.7 %
MCH RBC QN AUTO: 20.1 PG (ref 26.5–33)
MCHC RBC AUTO-ENTMCNC: 27 G/DL (ref 31.5–36.5)
MCV RBC AUTO: 75 FL (ref 78–100)
MONOCYTES # BLD AUTO: 0.9 10E9/L (ref 0–1.3)
MONOCYTES NFR BLD AUTO: 4.1 %
NEUTROPHILS # BLD AUTO: 16.6 10E9/L (ref 1.6–8.3)
NEUTROPHILS NFR BLD AUTO: 79 %
PLATELET # BLD AUTO: 538 10E9/L (ref 150–450)
RBC # BLD AUTO: 5.97 10E12/L (ref 4.4–5.9)
WBC # BLD AUTO: 21 10E9/L (ref 4–11)

## 2019-06-18 PROCEDURE — 85025 COMPLETE CBC W/AUTO DIFF WBC: CPT

## 2019-06-18 PROCEDURE — 36415 COLL VENOUS BLD VENIPUNCTURE: CPT

## 2019-07-30 DIAGNOSIS — D45 POLYCYTHEMIA VERA (H): ICD-10-CM

## 2019-07-30 LAB
BASOPHILS # BLD AUTO: 0.3 10E9/L (ref 0–0.2)
BASOPHILS NFR BLD AUTO: 1.5 %
BLOOD COLLECTION: NORMAL
DIFFERENTIAL METHOD BLD: ABNORMAL
EOSINOPHIL # BLD AUTO: 1 10E9/L (ref 0–0.7)
EOSINOPHIL NFR BLD AUTO: 4.9 %
ERYTHROCYTE [DISTWIDTH] IN BLOOD BY AUTOMATED COUNT: 19.6 % (ref 10–15)
HCT VFR BLD AUTO: 45.2 % (ref 40–53)
HGB BLD-MCNC: 12.7 G/DL (ref 13.3–17.7)
IMM GRANULOCYTES # BLD: 0.3 10E9/L (ref 0–0.4)
IMM GRANULOCYTES NFR BLD: 1.6 %
LYMPHOCYTES # BLD AUTO: 1.8 10E9/L (ref 0.8–5.3)
LYMPHOCYTES NFR BLD AUTO: 8.3 %
MCH RBC QN AUTO: 20.9 PG (ref 26.5–33)
MCHC RBC AUTO-ENTMCNC: 28.1 G/DL (ref 31.5–36.5)
MCV RBC AUTO: 74 FL (ref 78–100)
MONOCYTES # BLD AUTO: 1 10E9/L (ref 0–1.3)
MONOCYTES NFR BLD AUTO: 4.6 %
NEUTROPHILS # BLD AUTO: 16.9 10E9/L (ref 1.6–8.3)
NEUTROPHILS NFR BLD AUTO: 79.1 %
PLATELET # BLD AUTO: 490 10E9/L (ref 150–450)
RBC # BLD AUTO: 6.09 10E12/L (ref 4.4–5.9)
WBC # BLD AUTO: 21.3 10E9/L (ref 4–11)

## 2019-07-30 PROCEDURE — 36415 COLL VENOUS BLD VENIPUNCTURE: CPT

## 2019-07-30 PROCEDURE — 85025 COMPLETE CBC W/AUTO DIFF WBC: CPT

## 2019-08-05 ENCOUNTER — OFFICE VISIT (OUTPATIENT)
Dept: FAMILY MEDICINE | Facility: OTHER | Age: 84
End: 2019-08-05
Attending: NURSE PRACTITIONER
Payer: MEDICARE

## 2019-08-05 ENCOUNTER — HOSPITAL ENCOUNTER (OUTPATIENT)
Dept: GENERAL RADIOLOGY | Facility: OTHER | Age: 84
Discharge: HOME | End: 2019-08-05
Attending: PHYSICIAN ASSISTANT | Admitting: PHYSICIAN ASSISTANT
Payer: MEDICARE

## 2019-08-05 VITALS
RESPIRATION RATE: 16 BRPM | TEMPERATURE: 98.3 F | BODY MASS INDEX: 28.05 KG/M2 | HEART RATE: 60 BPM | SYSTOLIC BLOOD PRESSURE: 130 MMHG | WEIGHT: 201.1 LBS | DIASTOLIC BLOOD PRESSURE: 68 MMHG

## 2019-08-05 DIAGNOSIS — R07.81 RIB PAIN ON LEFT SIDE: Primary | ICD-10-CM

## 2019-08-05 DIAGNOSIS — R07.81 RIB PAIN ON LEFT SIDE: ICD-10-CM

## 2019-08-05 PROCEDURE — 71101 X-RAY EXAM UNILAT RIBS/CHEST: CPT | Mod: LT

## 2019-08-05 PROCEDURE — G0463 HOSPITAL OUTPT CLINIC VISIT: HCPCS

## 2019-08-05 PROCEDURE — 99213 OFFICE O/P EST LOW 20 MIN: CPT | Performed by: PHYSICIAN ASSISTANT

## 2019-08-05 PROCEDURE — G0463 HOSPITAL OUTPT CLINIC VISIT: HCPCS | Mod: 25

## 2019-08-05 RX ORDER — HYDROXYUREA 500 MG/1
500 CAPSULE ORAL
COMMUNITY
Start: 2019-07-31 | End: 2021-05-29

## 2019-08-05 ASSESSMENT — PAIN SCALES - GENERAL: PAINLEVEL: EXTREME PAIN (8)

## 2019-08-05 NOTE — PROGRESS NOTES
Subjective     Wilmer Shankar is a 85 year old male who presents to clinic today for the following health issues:    HPI   Fall with rib pain on left side and back  Patient was walking out the back door of his cabin and he tripped on the door frame and landed on his left side on the deck.   He did not hit his head, no LOC      Onset: 3 days ago    Description (location/character/radiation/duration): Sharp intermittent pain, non radiating    Intensity:  severe, 9/10    Accompanying signs and symptoms: pain with deep breath       Shortness of breath.cough: no        Sweating: no        Nausea/vomitting: no        Palpitations: no        Other (fevers/chills/cough/heartburn/lightheadedness): no     History (similar episodes/previous evaluation): None    Precipitating or alleviating factors:        Worse with exertion: no        Worse with breathing: YES       Related to eating: no        Better with burping: no     Therapies tried and outcome: tylenol 1000 mg at 6 AM today      There is no problem list on file for this patient.    Past Surgical History:   Procedure Laterality Date     HERNIA REPAIR       ORTHOPEDIC SURGERY      right knee replacement       Social History     Tobacco Use     Smoking status: Former Smoker     Smokeless tobacco: Never Used   Substance Use Topics     Alcohol use: Yes     Comment: modeerate     Family History   Problem Relation Age of Onset     Cancer - colorectal Brother          Current Outpatient Medications   Medication Sig Dispense Refill     hydroxyurea (HYDREA) 500 MG capsule CHEMO Take 500 mg by mouth       alendronate (FOSAMAX) 70 MG tablet Take 35 mg by mouth       Ascorbic Acid (VITAMIN C PO) Take  by mouth.       ASPIRIN PO Take  by mouth.       atorvastatin (LIPITOR) 40 MG tablet        calcium carbonate-vitamin D 600-400 MG-UNIT CHEW Take  by mouth.       Cholecalciferol (VITAMIN D3 PO) Take 5,000 Units by mouth daily.       Fexofenadine HCl (ALLEGRA PO) Take  by mouth.        LEVOTHYROXINE SODIUM PO Take  by mouth.       Sildenafil Citrate (VIAGRA PO) Take  by mouth.       TOPROL XL 25 MG 24 hr tablet        No Known Allergies      Review of Systems   ROS COMP: CONSTITUTIONAL: NEGATIVE for fever, chills, change in weight  ENT/MOUTH: NEGATIVE for ear, mouth and throat problems  RESP: NEGATIVE for significant cough or SOB  CV: NEGATIVE for chest pain, palpitations or peripheral edema  MUSCULOSKELETAL: POSITIVE  for rib pain on left side per HPI      Objective    Vitals:    08/05/19 1239   BP: 130/68   BP Location: Right arm   Patient Position: Sitting   Cuff Size: Adult Regular   Pulse: 60   Resp: 16   Temp: 98.3  F (36.8  C)   TempSrc: Tympanic   Weight: 91.2 kg (201 lb 1.6 oz)       Physical Exam   GENERAL: healthy, alert and no distress  EYES: No injection,  GLEN, EOMI  HENT: ear canals normal, no davis signs  NECK: no adenopathy, no asymmetry, masses, or scars and thyroid normal to palpation, normal ROM  RESP: lungs clear to auscultation - no rales, rhonchi or wheezes  CV: regular rate and rhythm, no murmur  ABDOMEN: soft, nontender,no masses and bowel sounds normal  MS: Spine - normal posture, no spinal tenderness.  Ribs non tender, no ecchymosis or deformity noted    Recent Results (from the past 24 hour(s))   XR Ribs & Chest Lt 3v    Narrative    PROCEDURE:  XR RIBS & CHEST LT 3VW    HISTORY: fall 3 days ago, landed on left lateral ribs onto his deck;  Rib pain on left side, .    COMPARISON:  None.    FINDINGS:  The cardiomediastinal contours are notable for borderline cardiomegaly  and tortuosity of the thoracic aorta. The main pulmonary artery is  mildly prominent.  The trachea is midline.  The bones are osteoporotic, limiting assessment. There are extensive  costochondral calcifications. There is an age indeterminant fracture  of the left anterior 10th rib. There is ill-defined opacity at the  left lung base that presumably reflects atelectasis related to  splinting. Some  bibasilar fibrosis is possible .      Impression    IMPRESSION:      Osteoporosis. Age-indeterminate left anterior 10th rib fracture. No  pneumothorax.      TONG ELDRIDGE MD         Assessment & Plan   Assessment  (R07.81) Rib pain on left side  (primary encounter diagnosis)  Plan: XR Ribs & Chest Lt 3v    Fall with rib pain on left side  XR chest/ribs: Osteoporosis. Age-indeterminate left anterior 10th rib fracture. No  pneumothorax.    Patient feels the fracture found on XR was of an old injury  Treatments:   Apply ice 10-20 minutes every 1-2 hours  Tylenol 650 mg every 4-6 hours, max 4000 mg/day  Discussed option for other pain medications - patient declined today  Follow up with PCP if symptoms persist or worsen.  Patient received verbal and written instruction including review of warning signs     Belkis Kelly PA-C  [facility]

## 2019-08-05 NOTE — NURSING NOTE
"Chief Complaint   Patient presents with     Back Pain     rib, back and hip on left side.  Patient fell on saturday, missed last step coming down from his cabin and fell on his left side.       Initial /68 (BP Location: Right arm, Patient Position: Sitting, Cuff Size: Adult Regular)   Pulse 60   Temp 98.3  F (36.8  C) (Tympanic)   Resp 16   Wt 91.2 kg (201 lb 1.6 oz)   BMI 28.05 kg/m   Estimated body mass index is 28.05 kg/m  as calculated from the following:    Height as of 5/21/19: 1.803 m (5' 11\").    Weight as of this encounter: 91.2 kg (201 lb 1.6 oz).  Medication Reconciliation: Completed     Francesca Huff LPN  "

## 2019-08-05 NOTE — PATIENT INSTRUCTIONS
Fall with rib pain on left side  XR chest/ribs: Osteoporosis. Age-indeterminate left anterior 10th rib fracture. No  pneumothorax.      Treatments:   Apply ice 10-20 minutes every 1-2 hours  Tylenol 650 mg every 4-6 hours, max 4000 mg/day  Follow up with PCP if symptoms persist or worsen.   Seek immediate care for    Fever of 100.4 F (38 C) or higher, or as directed by your healthcare provider    Stomach pain    Patient Education     Rib Fracture    You broke one or more ribs. This is called a rib fracture. Rib fractures don't need a cast like other bones. They will heal by themselves in about 4 to 6 weeks. The first 3 to 4 weeks will be the most painful. During this time deep breathing, coughing, or changing position from sitting to lying down, may cause the broken ends to move slightly.  Home care    Rest. You should not be doing any heavy lifting or strenuous exertion until the pain goes away.    It hurts to breathe when you have a broken rib. This puts you at risk of getting pneumonia from poor airflow through your lungs. To prevent this:  ? Take several very deep breaths once an hour while you're awake. Breathe out through pursed lips as if you are blowing up a balloon. If possible, actually blow up a balloon or a rubber glove. This exercise builds up pressure inside the lung and prevents collapse of the small air sacs of the lung. This exercise may cause some pain at the site of injury. This is normal.  ? You may have gotten a breathing exercise device called an incentive spirometer. Use it at least 4 times a day, or as directed.    Apply an ice pack over the injured area for 15 to 20 minutes every 1 to 2 hours. You should do this for the first 24 to 48 hours. To make an ice pack, put ice cubes in a plastic bag that seals at the top. Wrap the bag in a clean, thin towel or cloth. Never put ice or an ice pack directly on the skin. Keep using ice packs as needed for the relief of pain and swelling.    You may  use over-the-counter pain medicine to control pain, unless another pain medicine was prescribed. If you have chronic liver or kidney disease or ever had a stomach ulcer or GI (gastrointestinal) bleeding, talk with your healthcare provider before using these medicines.    If your pain is not controlled, contact your healthcare provider. Sometimes a stronger pain medicine may be needed. A nerve block can be done in case of severe pain. It will numb the nerve between the ribs.  Follow-up care  Follow up with your healthcare provider, or as advised. In rare cases, a broken rib will cause complications in the first few days that may not be clearly seen during your initial exam. This can include collapsed lung, bleeding around the lung or into the belly (abdomen), or pneumonia. So watch for the signs below.  If X-rays were taken, you will be told of any new findings that may affect your care.  Call 911  Call 911 if you have:    Dizziness, weakness or fainting    Shortness of breath with or without chest discomfort    New or worsening abdominal pain    Discomfort in other areas of your upper body such as your shoulders, jaw, neck, or arms  When to seek medical advice  Call your healthcare provider right away if any of these occur:    Increasing chest pain with breathing    Fever of 100.4 F (38 C) or above, or as directed by your healthcare provider    Congested cough, nausea, or vomiting  Date Last Reviewed: 4/1/2018 2000-2018 [employer]. [address] [place] [address], [place] [address]. All rights reserved. This information is not intended as a substitute for professional medical care. Always follow your healthcare professional's instructions.

## 2019-09-03 DIAGNOSIS — D45 POLYCYTHEMIA VERA (H): ICD-10-CM

## 2019-09-03 LAB
BASOPHILS # BLD AUTO: 0.4 10E9/L (ref 0–0.2)
BASOPHILS NFR BLD AUTO: 1.5 %
DIFFERENTIAL METHOD BLD: ABNORMAL
EOSINOPHIL # BLD AUTO: 1 10E9/L (ref 0–0.7)
EOSINOPHIL NFR BLD AUTO: 4.2 %
ERYTHROCYTE [DISTWIDTH] IN BLOOD BY AUTOMATED COUNT: 20.5 % (ref 10–15)
HCT VFR BLD AUTO: 45.9 % (ref 40–53)
HGB BLD-MCNC: 12.7 G/DL (ref 13.3–17.7)
IMM GRANULOCYTES # BLD: 1.1 10E9/L (ref 0–0.4)
IMM GRANULOCYTES NFR BLD: 4.7 %
LYMPHOCYTES # BLD AUTO: 1.6 10E9/L (ref 0.8–5.3)
LYMPHOCYTES NFR BLD AUTO: 6.6 %
MCH RBC QN AUTO: 20.5 PG (ref 26.5–33)
MCHC RBC AUTO-ENTMCNC: 27.7 G/DL (ref 31.5–36.5)
MCV RBC AUTO: 74 FL (ref 78–100)
MONOCYTES # BLD AUTO: 0.9 10E9/L (ref 0–1.3)
MONOCYTES NFR BLD AUTO: 3.9 %
NEUTROPHILS # BLD AUTO: 19 10E9/L (ref 1.6–8.3)
NEUTROPHILS NFR BLD AUTO: 79.1 %
PLATELET # BLD AUTO: 524 10E9/L (ref 150–450)
RBC # BLD AUTO: 6.2 10E12/L (ref 4.4–5.9)
WBC # BLD AUTO: 23.9 10E9/L (ref 4–11)

## 2019-09-03 PROCEDURE — 85025 COMPLETE CBC W/AUTO DIFF WBC: CPT

## 2019-09-03 PROCEDURE — 36415 COLL VENOUS BLD VENIPUNCTURE: CPT

## 2019-09-18 DIAGNOSIS — D45 POLYCYTHEMIA VERA (H): ICD-10-CM

## 2019-09-18 LAB
BASOPHILS # BLD AUTO: 0.4 10E9/L (ref 0–0.2)
BASOPHILS NFR BLD AUTO: 1.5 %
DIFFERENTIAL METHOD BLD: ABNORMAL
EOSINOPHIL # BLD AUTO: 1 10E9/L (ref 0–0.7)
EOSINOPHIL NFR BLD AUTO: 4.2 %
ERYTHROCYTE [DISTWIDTH] IN BLOOD BY AUTOMATED COUNT: 20.9 % (ref 10–15)
HCT VFR BLD AUTO: 45 % (ref 40–53)
HGB BLD-MCNC: 12.3 G/DL (ref 13.3–17.7)
IMM GRANULOCYTES # BLD: 0.7 10E9/L (ref 0–0.4)
IMM GRANULOCYTES NFR BLD: 2.9 %
LYMPHOCYTES # BLD AUTO: 1.7 10E9/L (ref 0.8–5.3)
LYMPHOCYTES NFR BLD AUTO: 7.7 %
MCH RBC QN AUTO: 20.6 PG (ref 26.5–33)
MCHC RBC AUTO-ENTMCNC: 27.3 G/DL (ref 31.5–36.5)
MCV RBC AUTO: 76 FL (ref 78–100)
MONOCYTES # BLD AUTO: 1 10E9/L (ref 0–1.3)
MONOCYTES NFR BLD AUTO: 4.4 %
NEUTROPHILS # BLD AUTO: 18 10E9/L (ref 1.6–8.3)
NEUTROPHILS NFR BLD AUTO: 79.3 %
PLATELET # BLD AUTO: 527 10E9/L (ref 150–450)
RBC # BLD AUTO: 5.96 10E12/L (ref 4.4–5.9)
WBC # BLD AUTO: 22.7 10E9/L (ref 4–11)

## 2019-09-18 PROCEDURE — 85025 COMPLETE CBC W/AUTO DIFF WBC: CPT | Mod: ZL

## 2019-09-18 PROCEDURE — 36415 COLL VENOUS BLD VENIPUNCTURE: CPT | Mod: ZL

## 2019-10-16 DIAGNOSIS — D45 POLYCYTHEMIA VERA (H): ICD-10-CM

## 2019-10-16 LAB
BASOPHILS # BLD AUTO: 0.3 10E9/L (ref 0–0.2)
BASOPHILS NFR BLD AUTO: 1.4 %
DIFFERENTIAL METHOD BLD: ABNORMAL
EOSINOPHIL # BLD AUTO: 0.8 10E9/L (ref 0–0.7)
EOSINOPHIL NFR BLD AUTO: 4 %
ERYTHROCYTE [DISTWIDTH] IN BLOOD BY AUTOMATED COUNT: 20.4 % (ref 10–15)
HCT VFR BLD AUTO: 44.3 % (ref 40–53)
HGB BLD-MCNC: 12.3 G/DL (ref 13.3–17.7)
IMM GRANULOCYTES # BLD: 0.4 10E9/L (ref 0–0.4)
IMM GRANULOCYTES NFR BLD: 1.7 %
LYMPHOCYTES # BLD AUTO: 1.7 10E9/L (ref 0.8–5.3)
LYMPHOCYTES NFR BLD AUTO: 8.3 %
MCH RBC QN AUTO: 20.6 PG (ref 26.5–33)
MCHC RBC AUTO-ENTMCNC: 27.8 G/DL (ref 31.5–36.5)
MCV RBC AUTO: 74 FL (ref 78–100)
MONOCYTES # BLD AUTO: 0.9 10E9/L (ref 0–1.3)
MONOCYTES NFR BLD AUTO: 4.5 %
NEUTROPHILS # BLD AUTO: 16.2 10E9/L (ref 1.6–8.3)
NEUTROPHILS NFR BLD AUTO: 80.1 %
PLATELET # BLD AUTO: 537 10E9/L (ref 150–450)
RBC # BLD AUTO: 5.96 10E12/L (ref 4.4–5.9)
WBC # BLD AUTO: 20.2 10E9/L (ref 4–11)

## 2019-10-16 PROCEDURE — 85025 COMPLETE CBC W/AUTO DIFF WBC: CPT | Mod: ZL

## 2019-10-16 PROCEDURE — 36415 COLL VENOUS BLD VENIPUNCTURE: CPT | Mod: ZL

## 2020-05-26 DIAGNOSIS — D45 POLYCYTHEMIA VERA (H): ICD-10-CM

## 2020-05-26 LAB
BASOPHILS # BLD AUTO: 0.3 10E9/L (ref 0–0.2)
BASOPHILS NFR BLD AUTO: 1.6 %
BLOOD COLLECTION: NORMAL
DIFFERENTIAL METHOD BLD: ABNORMAL
EOSINOPHIL # BLD AUTO: 0.8 10E9/L (ref 0–0.7)
EOSINOPHIL NFR BLD AUTO: 3.9 %
ERYTHROCYTE [DISTWIDTH] IN BLOOD BY AUTOMATED COUNT: 20.3 % (ref 10–15)
HCT VFR BLD AUTO: 49.3 % (ref 40–53)
HGB BLD-MCNC: 13.3 G/DL (ref 13.3–17.7)
IMM GRANULOCYTES # BLD: 0.4 10E9/L (ref 0–0.4)
IMM GRANULOCYTES NFR BLD: 1.8 %
LYMPHOCYTES # BLD AUTO: 1.9 10E9/L (ref 0.8–5.3)
LYMPHOCYTES NFR BLD AUTO: 9 %
MCH RBC QN AUTO: 20.1 PG (ref 26.5–33)
MCHC RBC AUTO-ENTMCNC: 27 G/DL (ref 31.5–36.5)
MCV RBC AUTO: 75 FL (ref 78–100)
MONOCYTES # BLD AUTO: 0.9 10E9/L (ref 0–1.3)
MONOCYTES NFR BLD AUTO: 4.4 %
NEUTROPHILS # BLD AUTO: 16.3 10E9/L (ref 1.6–8.3)
NEUTROPHILS NFR BLD AUTO: 79.3 %
PLATELET # BLD AUTO: 461 10E9/L (ref 150–450)
RBC # BLD AUTO: 6.61 10E12/L (ref 4.4–5.9)
WBC # BLD AUTO: 20.5 10E9/L (ref 4–11)

## 2020-05-26 PROCEDURE — 36415 COLL VENOUS BLD VENIPUNCTURE: CPT | Mod: ZL

## 2020-05-26 PROCEDURE — 85025 COMPLETE CBC W/AUTO DIFF WBC: CPT | Mod: ZL

## 2020-06-23 DIAGNOSIS — D45 POLYCYTHEMIA VERA (H): ICD-10-CM

## 2020-06-23 LAB
BASOPHILS # BLD AUTO: 0.4 10E9/L (ref 0–0.2)
BASOPHILS NFR BLD AUTO: 1.4 %
DIFFERENTIAL METHOD BLD: ABNORMAL
EOSINOPHIL # BLD AUTO: 1 10E9/L (ref 0–0.7)
EOSINOPHIL NFR BLD AUTO: 3.8 %
ERYTHROCYTE [DISTWIDTH] IN BLOOD BY AUTOMATED COUNT: 20.1 % (ref 10–15)
HCT VFR BLD AUTO: 45.9 % (ref 40–53)
HGB BLD-MCNC: 12.6 G/DL (ref 13.3–17.7)
IMM GRANULOCYTES # BLD: 0.8 10E9/L (ref 0–0.4)
IMM GRANULOCYTES NFR BLD: 2.8 %
LYMPHOCYTES # BLD AUTO: 2.2 10E9/L (ref 0.8–5.3)
LYMPHOCYTES NFR BLD AUTO: 8.2 %
MCH RBC QN AUTO: 20.2 PG (ref 26.5–33)
MCHC RBC AUTO-ENTMCNC: 27.5 G/DL (ref 31.5–36.5)
MCV RBC AUTO: 74 FL (ref 78–100)
MONOCYTES # BLD AUTO: 1.4 10E9/L (ref 0–1.3)
MONOCYTES NFR BLD AUTO: 4.9 %
NEUTROPHILS # BLD AUTO: 21.7 10E9/L (ref 1.6–8.3)
NEUTROPHILS NFR BLD AUTO: 78.9 %
PLATELET # BLD AUTO: 478 10E9/L (ref 150–450)
RBC # BLD AUTO: 6.23 10E12/L (ref 4.4–5.9)
WBC # BLD AUTO: 27.4 10E9/L (ref 4–11)

## 2020-06-23 PROCEDURE — 85025 COMPLETE CBC W/AUTO DIFF WBC: CPT | Mod: ZL

## 2020-06-23 PROCEDURE — 36415 COLL VENOUS BLD VENIPUNCTURE: CPT | Mod: ZL

## 2020-07-22 DIAGNOSIS — D45 POLYCYTHEMIA VERA (H): ICD-10-CM

## 2020-07-22 LAB
BASOPHILS # BLD AUTO: 0.3 10E9/L (ref 0–0.2)
BASOPHILS NFR BLD AUTO: 1.3 %
DIFFERENTIAL METHOD BLD: ABNORMAL
EOSINOPHIL # BLD AUTO: 1 10E9/L (ref 0–0.7)
EOSINOPHIL NFR BLD AUTO: 4.6 %
ERYTHROCYTE [DISTWIDTH] IN BLOOD BY AUTOMATED COUNT: 20 % (ref 10–15)
HCT VFR BLD AUTO: 46.5 % (ref 40–53)
HGB BLD-MCNC: 12.6 G/DL (ref 13.3–17.7)
IMM GRANULOCYTES # BLD: 0.4 10E9/L (ref 0–0.4)
IMM GRANULOCYTES NFR BLD: 1.6 %
LYMPHOCYTES # BLD AUTO: 2 10E9/L (ref 0.8–5.3)
LYMPHOCYTES NFR BLD AUTO: 9.1 %
MCH RBC QN AUTO: 19.7 PG (ref 26.5–33)
MCHC RBC AUTO-ENTMCNC: 27.1 G/DL (ref 31.5–36.5)
MCV RBC AUTO: 73 FL (ref 78–100)
MONOCYTES # BLD AUTO: 0.8 10E9/L (ref 0–1.3)
MONOCYTES NFR BLD AUTO: 3.6 %
NEUTROPHILS # BLD AUTO: 17.6 10E9/L (ref 1.6–8.3)
NEUTROPHILS NFR BLD AUTO: 79.8 %
PLATELET # BLD AUTO: 568 10E9/L (ref 150–450)
RBC # BLD AUTO: 6.4 10E12/L (ref 4.4–5.9)
WBC # BLD AUTO: 22.1 10E9/L (ref 4–11)

## 2020-07-22 PROCEDURE — 85025 COMPLETE CBC W/AUTO DIFF WBC: CPT | Mod: ZL

## 2020-07-22 PROCEDURE — 36415 COLL VENOUS BLD VENIPUNCTURE: CPT | Mod: ZL

## 2020-07-23 DIAGNOSIS — D45 POLYCYTHEMIA VERA (H): Primary | ICD-10-CM

## 2020-08-03 ENCOUNTER — OFFICE VISIT (OUTPATIENT)
Dept: FAMILY MEDICINE | Facility: OTHER | Age: 85
End: 2020-08-03
Attending: PHYSICIAN ASSISTANT
Payer: MEDICARE

## 2020-08-03 ENCOUNTER — HOSPITAL ENCOUNTER (OUTPATIENT)
Dept: ULTRASOUND IMAGING | Facility: OTHER | Age: 85
End: 2020-08-03
Attending: PHYSICIAN ASSISTANT
Payer: MEDICARE

## 2020-08-03 ENCOUNTER — HOSPITAL ENCOUNTER (OUTPATIENT)
Dept: GENERAL RADIOLOGY | Facility: OTHER | Age: 85
End: 2020-08-03
Attending: PHYSICIAN ASSISTANT
Payer: MEDICARE

## 2020-08-03 VITALS
RESPIRATION RATE: 22 BRPM | OXYGEN SATURATION: 97 % | DIASTOLIC BLOOD PRESSURE: 76 MMHG | SYSTOLIC BLOOD PRESSURE: 100 MMHG | HEART RATE: 56 BPM

## 2020-08-03 DIAGNOSIS — M79.604 PAIN OF RIGHT LOWER EXTREMITY: Primary | ICD-10-CM

## 2020-08-03 PROCEDURE — 72170 X-RAY EXAM OF PELVIS: CPT

## 2020-08-03 PROCEDURE — G0463 HOSPITAL OUTPT CLINIC VISIT: HCPCS | Mod: 25

## 2020-08-03 PROCEDURE — 99213 OFFICE O/P EST LOW 20 MIN: CPT | Performed by: PHYSICIAN ASSISTANT

## 2020-08-03 PROCEDURE — 93971 EXTREMITY STUDY: CPT | Mod: RT

## 2020-08-03 PROCEDURE — G0463 HOSPITAL OUTPT CLINIC VISIT: HCPCS

## 2020-08-03 SDOH — HEALTH STABILITY: MENTAL HEALTH: HOW OFTEN DO YOU HAVE A DRINK CONTAINING ALCOHOL?: 4 OR MORE TIMES A WEEK

## 2020-08-03 SDOH — HEALTH STABILITY: MENTAL HEALTH: HOW MANY STANDARD DRINKS CONTAINING ALCOHOL DO YOU HAVE ON A TYPICAL DAY?: 1 OR 2

## 2020-08-03 ASSESSMENT — PAIN SCALES - GENERAL: PAINLEVEL: EXTREME PAIN (8)

## 2020-08-03 NOTE — PATIENT INSTRUCTIONS
Pain in right groin/pelvis, over femoral artery after a fall last week  XR pelvis - No fracture or dislocation is seen. There is degenerative change in  the lower lumbar spine and right hip  US right leg no DVT  Will treat symptomatically for muscle strain/arthritic pain from hip and back  Apply ice 10-20 minutes alternating with heat 10-20 minutes 4 or more times daily  Tylenol 650 mg every 4-6 hours, max 2200-8808 mg/day  Can also apply icy hot or topical lidocaine, take as directed  Follow up with PCP if symptoms persist or worsen         Patient Education     Muscle Strain in the Extremities  A muscle strain is a stretching and tearing of muscle fibers. This causes pain, especially when you move that muscle. There may also be some swelling and bruising.  Home care    Keep the hurt area raised above heart level to reduce pain and swelling. This is especially important during the first 48 hours.    Apply an ice pack over the injured area for 15 to 20 minutes every 3 to 6 hours. You should do this for the first 24 to 48 hours. You can make an ice pack by filling a plastic bag that seals at the top with ice cubes and then wrapping it with a thin towel. Be careful not to injure your skin with the ice treatments. Ice should never be applied directly to skin. Continue the use of ice packs for relief of pain and swelling as needed. After 48 hours, apply heat (warm shower or warm bath) for 15 to 20 minutes several times a day, or alternate ice and heat.    You may use over-the-counter pain medicine to control pain, unless another medicine was prescribed. If you have chronic liver or kidney disease or ever had a stomach ulcer or gastrointestinal bleeding, talk with your healthcare provider before using these medicines.    For leg strains: If crutches have been recommended, don t put full weight on the hurt leg until you can do so without pain. You can return to sports when you are able to hop and run on the injured leg  without pain.  Follow-up care  Follow up with your healthcare provider, or as advised.  When to seek medical advice  Call your healthcare provider right away if any of these occur:    The toes of the injured leg become swollen, cold, blue, numb, or tingly    Pain or swelling increases  Date Last Reviewed: 5/1/2018 2000-2019 [employer]. [address] [place] [address]. All rights reserved. This information is not intended as a substitute for professional medical care. Always follow your healthcare professional's instructions.           Patient Education     Living with Osteoarthritis    Osteoarthritis is a chronic disease and the most common type of arthritis. But it doesn t have to keep you from leading an active life. You can help control symptoms by exercising and losing weight if you are overweight. Using special tools also helps make life easier. Be sure to see your healthcare provider for scheduled checkups and lab work. If you have questions or concerns between office visits, call your healthcare provider's office.  Make exercise part of your life  Gentle exercise can help lessen your pain. Keep the following in mind:    Choose exercises that improve joint motion and make your muscles stronger. Your healthcare provider or a physical therapist may suggest a few.    Stretching and flexibility activities such as yoga and arti chi may improve pain and joint motion.    Try low-impact sports, such as walking, biking, or doing exercises in a warm pool.    Most people should exercise for at least 30 minutes a day on most days of the week. This can be broken up into shorter periods throughout the day.    Don t push yourself too hard at first. Slowly build up over time.    Make sure you warm up for 5 to 10 minutes before you exercise.    If pain and stiffness increase, don't exercise as hard or as long.  Watch your weight  If you weigh more than you should, your weight-bearing joints are under  extra pressure. This makes your symptoms worse. To reduce pain and stiffness, try losing a few of those extra pounds. The tips below may help:    Start a weight-loss program with the help of your healthcare provider.    Ask your friends and family for support.    Join a weight-loss group.  Use special tools  Even simple tasks can be hard to do when your joints hurt. Special tools called assistive devices can make things easier by reducing strain and protecting your joints. Ask your healthcare provider where to find these and other helpful tools:    Long-handled reachers or grabbers    Jar openers and button threaders    Large  for pencils, garden tools, and other handheld objects  Use mobility and other aids  People with arthritis and other joint problems often use mobility aids to help with walking. For example, they may use canes or walkers. They may also use splints or braces to support joints. Talk with your healthcare provider or physical therapist about these aids:    A cane to reduce knee or hip pain and help prevent falls    Splints for your wrists or other joints    A brace to support a weak knee joint    Orthotics for toe and foot involvement  Medical and surgical treatments  Discuss medical treatments with your healthcare provider to help reduce your pain and improve joint mobility. Treatments may include:    Topical medicines such as lidocaine, capsaicin, and diclofenac gel    Oral medicines such as acetaminophen, NSAIDs (nonsteroidal anti-inflammatory drugs) such as ibuprofen and naproxen, or opioids    Injections in affected joints such as corticosteroids in various joints, or hyaluronic acid in the knee joints    Surgical repair or surgical joint replacement with artificial joints    Complementary therapies such as heat and cold treatment, massage, acupuncture, supplements, cognitive training, meditation, and others. Discuss these options with your healthcare provider.  Date Last Reviewed:  6/1/2018 2000-2019 [employer]. [address] [place] [address], [place] [address]. All rights reserved. This information is not intended as a substitute for professional medical care. Always follow your healthcare professional's instructions.

## 2020-08-03 NOTE — PROGRESS NOTES
Patient presents to the clinic with right leg pain from the groin down to the ankle. Worse when walking, started 8/2/2020.  Roslyn Forrester LPN.......  8/3/2020  1:54 PM    HPI:    Wilmer Shankar is a 86 year old male who presents to clinic today for evaluation of right leg pain. Onset the past 2-3 days. He noticed it when he got in the morning to take a few steps. Pain is in right pelvis/groin area and radiates down his leg. Recent injury: he fell about a week ago. He was going down some stairs outside and missed a step landing on the cement onto his left shoulder/left knee/wrist. He also got a few scratches on his face. He has had a left knee replacement and left ankle fracture with surgery.     Patient is concerned about possible fracture or blood clot. He is on Asprin daily, no other blood thinner.     Past Medical History:   Diagnosis Date     High blood cholesterol level      Hypertension      high cholesterol     Thyroid disease        Past Surgical History:   Procedure Laterality Date     HERNIA REPAIR       ORTHOPEDIC SURGERY      right knee replacement       Current Outpatient Medications   Medication Sig Dispense Refill     Ascorbic Acid (VITAMIN C PO) Take  by mouth.       ASPIRIN PO Take  by mouth.       atorvastatin (LIPITOR) 40 MG tablet        calcium carbonate-vitamin D 600-400 MG-UNIT CHEW Take  by mouth.       Cholecalciferol (VITAMIN D3 PO) Take 5,000 Units by mouth daily.       Fexofenadine HCl (ALLEGRA PO) Take  by mouth.       hydroxyurea (HYDREA) 500 MG capsule CHEMO Take 500 mg by mouth       LEVOTHYROXINE SODIUM PO Take  by mouth.       Sildenafil Citrate (VIAGRA PO) Take  by mouth.       TOPROL XL 25 MG 24 hr tablet        alendronate (FOSAMAX) 70 MG tablet Take 35 mg by mouth         No Known Allergies    ROS:  General: feels well, no fever  HENT: negative  Respiratory: negative for cough/shortness of breath  Cardiac: negative  Abdomen: negative  Musculoskeletal: POSITIVE per HPI -  pain right leg    EXAM:  Vitals:    08/03/20 1358   BP: 100/76   BP Location: Right arm   Patient Position: Sitting   Cuff Size: Adult Regular   Pulse: 56   Resp: 22   SpO2: 97%     General appearance: well appearing male, in no acute distress  Respiratory: clear to auscultation bilaterally  Cardiac: RRR with no murmurs  Extremities: no leg swelling or calf tenderness  Musculoskeletal: Spine: normal posture, non tender neck and spine, negative straight leg  Hip/pelvis: TTP anterior pelvis area and thigh over femoral artery. Non tender hip.   ROM hip - painful in pelvis area with adduction, abduction, extension, internal rotation  Dermatological: no rashes or lesions  Psychological: normal affect, alert and pleasant    Results for orders placed or performed in visit on 08/03/20   XR Pelvis 1/2 Views     Status: None    Narrative    PROCEDURE: XR PELVIS 1/2 VW 8/3/2020 3:00 PM    HISTORY: pain right pelvis; Pain of right lower extremity    COMPARISONS: None.    TECHNIQUE: AP view of the pelvis.    FINDINGS: There is severe narrowing of the right hip joint space. This  is relatively circumferential. There is not significant spur formation  in the acetabular side of the joint but there is a rim of osteophytes  at the head neck junction of the proximal femur.    No fracture or dislocation is seen. There is degenerative change in  the lower lumbar spine.         Impression    IMPRESSION: Degenerative change in the right hip.    LIZ THORPE MD           ASSESSMENT AND PLAN:     Diagnosis Comments   1. Pain of right lower extremity  US Lower Extremity Venous Duplex Right, XR Pelvis 1/2 Views      Pain in right groin/pelvis, over femoral artery after a fall last week  XR pelvis - No fracture or dislocation is seen. There is degenerative change in the lower lumbar spine and right hip  US right leg no DVT  Will treat symptomatically for muscle strain/arthritic pain from hip and back  Apply ice 10-20 minutes alternating  with heat 10-20 minutes 4 or more times daily  Tylenol 650 mg every 4-6 hours, max 5128-7863 mg/day  Can also apply icy hot or topical lidocaine, take as directed  Follow up with PCP if symptoms persist or worsen  Patient received verbal and written instruction including review of warning signs      Belkis Kelly PA-C

## 2020-09-17 DIAGNOSIS — D45 POLYCYTHEMIA VERA (H): ICD-10-CM

## 2020-09-17 LAB
BASOPHILS # BLD AUTO: 0.4 10E9/L (ref 0–0.2)
BASOPHILS NFR BLD AUTO: 1.4 %
DIFFERENTIAL METHOD BLD: ABNORMAL
EOSINOPHIL # BLD AUTO: 1 10E9/L (ref 0–0.7)
EOSINOPHIL NFR BLD AUTO: 4 %
ERYTHROCYTE [DISTWIDTH] IN BLOOD BY AUTOMATED COUNT: 21 % (ref 10–15)
HCT VFR BLD AUTO: 47.4 % (ref 40–53)
HGB BLD-MCNC: 12.6 G/DL (ref 13.3–17.7)
IMM GRANULOCYTES # BLD: 0.3 10E9/L (ref 0–0.4)
IMM GRANULOCYTES NFR BLD: 1.1 %
LYMPHOCYTES # BLD AUTO: 2 10E9/L (ref 0.8–5.3)
LYMPHOCYTES NFR BLD AUTO: 8.4 %
MCH RBC QN AUTO: 19.3 PG (ref 26.5–33)
MCHC RBC AUTO-ENTMCNC: 26.6 G/DL (ref 31.5–36.5)
MCV RBC AUTO: 73 FL (ref 78–100)
MONOCYTES # BLD AUTO: 0.9 10E9/L (ref 0–1.3)
MONOCYTES NFR BLD AUTO: 3.7 %
NEUTROPHILS # BLD AUTO: 19.7 10E9/L (ref 1.6–8.3)
NEUTROPHILS NFR BLD AUTO: 81.4 %
PLATELET # BLD AUTO: 477 10E9/L (ref 150–450)
RBC # BLD AUTO: 6.52 10E12/L (ref 4.4–5.9)
WBC # BLD AUTO: 24.2 10E9/L (ref 4–11)

## 2020-09-17 PROCEDURE — 36415 COLL VENOUS BLD VENIPUNCTURE: CPT | Mod: ZL

## 2020-09-17 PROCEDURE — 85025 COMPLETE CBC W/AUTO DIFF WBC: CPT | Mod: ZL

## 2021-05-29 ENCOUNTER — OFFICE VISIT (OUTPATIENT)
Dept: FAMILY MEDICINE | Facility: OTHER | Age: 86
End: 2021-05-29
Attending: NURSE PRACTITIONER
Payer: MEDICARE

## 2021-05-29 VITALS
TEMPERATURE: 98.3 F | BODY MASS INDEX: 23.1 KG/M2 | SYSTOLIC BLOOD PRESSURE: 128 MMHG | OXYGEN SATURATION: 96 % | HEART RATE: 52 BPM | WEIGHT: 165.6 LBS | RESPIRATION RATE: 18 BRPM | DIASTOLIC BLOOD PRESSURE: 74 MMHG

## 2021-05-29 DIAGNOSIS — H10.9 BACTERIAL CONJUNCTIVITIS OF LEFT EYE: Primary | ICD-10-CM

## 2021-05-29 PROCEDURE — 99213 OFFICE O/P EST LOW 20 MIN: CPT | Performed by: FAMILY MEDICINE

## 2021-05-29 PROCEDURE — G0463 HOSPITAL OUTPT CLINIC VISIT: HCPCS

## 2021-05-29 RX ORDER — APIXABAN 2.5 MG/1
2.5 TABLET, FILM COATED ORAL 2 TIMES DAILY
COMMUNITY
Start: 2021-04-13

## 2021-05-29 RX ORDER — SODIUM BICARBONATE 650 MG/1
650 TABLET ORAL
COMMUNITY
Start: 2021-04-13

## 2021-05-29 RX ORDER — CHLORTHALIDONE 25 MG/1
TABLET ORAL
COMMUNITY
Start: 2020-08-20 | End: 2024-01-01

## 2021-05-29 RX ORDER — AMLODIPINE BESYLATE 5 MG/1
5 TABLET ORAL
COMMUNITY
Start: 2020-09-30 | End: 2024-01-01

## 2021-05-29 RX ORDER — LEVOTHYROXINE SODIUM 150 UG/1
150 TABLET ORAL DAILY
COMMUNITY
Start: 2021-04-20

## 2021-05-29 RX ORDER — TOLTERODINE 4 MG/1
CAPSULE, EXTENDED RELEASE ORAL
COMMUNITY
Start: 2020-12-31

## 2021-05-29 RX ORDER — ATORVASTATIN CALCIUM 10 MG/1
10 TABLET, FILM COATED ORAL DAILY
COMMUNITY
Start: 2021-05-20

## 2021-05-29 RX ORDER — OFLOXACIN 3 MG/ML
1-2 SOLUTION/ DROPS OPHTHALMIC 4 TIMES DAILY
Qty: 10 ML | Refills: 0 | Status: SHIPPED | OUTPATIENT
Start: 2021-05-29 | End: 2024-01-01

## 2021-05-29 RX ORDER — HYDROXYUREA 500 MG/1
CAPSULE ORAL
COMMUNITY
Start: 2021-01-14

## 2021-05-29 ASSESSMENT — PAIN SCALES - GENERAL: PAINLEVEL: NO PAIN (0)

## 2021-05-29 NOTE — PATIENT INSTRUCTIONS
Patient Education     Bacterial Conjunctivitis    You have an infection in the membranes covering the white part of the eye. This part of the eye is called the conjunctiva. The infection is called conjunctivitis. The most common symptoms of conjunctivitis include a thick, pus-like discharge from the eye, swollen eyelids, redness, eyelids sticking together upon awakening, and a gritty or scratchy feeling in the eye. Your infection was caused by bacteria. It may be treated with medicine. With treatment, the infection takes about 7 to 10 days to resolve.   Home care    Use prescribed antibiotic eye drops or ointment as directed to treat the infection.    Apply a warm compress (towel soaked in warm water) to the affected eye 3 to 4 times a day. Do this just before applying medicine to the eye.    Use a warm, wet cloth to wipe away crusting of the eyelids in the morning. This is caused by mucus drainage during the night. You may also use saline irrigating solution or artificial tears to rinse away mucus in the eye. Do not put a patch over the eye.    Wash your hands before and after touching the infected eye. This is to prevent spreading the infection to the other eye, and to other people. Don't share your towels or washcloths with others.    You may use acetaminophen or ibuprofen to control pain, unless another medicine was prescribed. Talk with your healthcare provider before using these medicines if you have chronic liver or kidney disease. Also talk with your provider if you have ever had a stomach ulcer or digestive bleeding.    Don't wear contact lenses until your eyes have healed and all symptoms are gone.    Follow-up care  Follow up with your healthcare provider, or as advised.  When to seek medical advice  Call your healthcare provider right away if any of these occur:    Worsening vision    Increasing pain in the eye    Increasing swelling or redness of the eyelid    Redness spreading around the eye  [employer]  last reviewed this educational content on 4/1/2020 2000-2021 [employer]. All rights reserved. This information is not intended as a substitute for professional medical care. Always follow your healthcare professional's instructions.

## 2021-05-29 NOTE — NURSING NOTE
"Chief Complaint   Patient presents with     Eye Problem     Eye redness and irritation has been going on for about a week. He states that he woke up and thought he had a grain of sand in his eye. It has been red since.     LT eye 10/32  RT eye 10/20    Initial There were no vitals taken for this visit. Estimated body mass index is 28.05 kg/m  as calculated from the following:    Height as of 5/21/19: 1.803 m (5' 11\").    Weight as of 8/5/19: 91.2 kg (201 lb 1.6 oz).         Medication Reconciliation: Complete      Fredo Riley LPN   "

## 2021-05-29 NOTE — PROGRESS NOTES
SUBJECTIVE:   Wilmer Shankar is a 87 year old male who presents to clinic today for the following health issues:    HPI  Wilmer is here for concerns of a red L eye.  Has been worsening over the last 1.5-2 weeks.  Started off like a 'piece of sand' in there at first.  Now no pain; but mattering in the AM (has to pry open), red, draining during the day.  No allergies.  No prior eye surgeries or problems.  Grandchildren had pink eye a few weeks ago.  No loss of vision; no double vision.   No headache/confusion.    There are no active problems to display for this patient.    Past Medical History:   Diagnosis Date     High blood cholesterol level      Hypertension      high cholesterol     Thyroid disease       Past Surgical History:   Procedure Laterality Date     HERNIA REPAIR       ORTHOPEDIC SURGERY      right knee replacement     Family History   Problem Relation Age of Onset     Cancer - colorectal Brother      Social History     Tobacco Use     Smoking status: Former Smoker     Smokeless tobacco: Never Used   Substance Use Topics     Alcohol use: Yes     Frequency: 4 or more times a week     Drinks per session: 1 or 2     Comment: modeerate     Social History     Social History Narrative     Not on file     Current Outpatient Medications   Medication Sig Dispense Refill     alendronate (FOSAMAX) 70 MG tablet Take 35 mg by mouth       amLODIPine (NORVASC) 5 MG tablet Take 5 mg by mouth       Ascorbic Acid (VITAMIN C PO) Take  by mouth.       ascorbic acid (VITAMIN C) 500 MG CPCR CR capsule        ASPIRIN PO Take  by mouth.       atorvastatin (LIPITOR) 10 MG tablet Take 10 mg by mouth daily       calcium carbonate-vitamin D 600-400 MG-UNIT CHEW Take  by mouth.       chlorthalidone (HYGROTON) 25 MG tablet Take 1/2 Tablet by mouth daily       Cholecalciferol (VITAMIN D3 PO) Take 5,000 Units by mouth daily.       ELIQUIS ANTICOAGULANT 2.5 MG tablet Take 5 mg (2 tabs) twice daily for 7 days then 2.5 mg (1 tab)  daily       Fexofenadine HCl (ALLEGRA PO) Take  by mouth.       hydroxyurea (HYDREA) 500 MG capsule TAKE 1 CAPSULE EVERY MONDAY, WEDNESDAY AND FRIDAY       levothyroxine (SYNTHROID/LEVOTHROID) 175 MCG tablet Take 175 mcg by mouth daily       ofloxacin (OCUFLOX) 0.3 % ophthalmic solution Place 1-2 drops Into the left eye 4 times daily 10 mL 0     Sildenafil Citrate (VIAGRA PO) Take  by mouth.       sodium bicarbonate 650 MG tablet Take 650 mg by mouth       tolterodine ER (DETROL LA) 4 MG 24 hr capsule TAKE 1 CAPSULE DAILY       TOPROL XL 25 MG 24 hr tablet        No Known Allergies      Review of Systems     OBJECTIVE:     /74   Pulse 52   Temp 98.3  F (36.8  C) (Tympanic)   Resp 18   Wt 75.1 kg (165 lb 9.6 oz)   SpO2 96%   BMI 23.10 kg/m    Body mass index is 23.1 kg/m .  Physical Exam  Vitals signs and nursing note reviewed.   Constitutional:       Appearance: Normal appearance.   HENT:      Head: Normocephalic and atraumatic.   Eyes:      General: No scleral icterus.        Right eye: No discharge.         Left eye: Discharge present.     Extraocular Movements: Extraocular movements intact.      Conjunctiva/sclera:      Left eye: Left conjunctiva is injected. Exudate present.      Pupils: Pupils are equal, round, and reactive to light.      Comments: Ectropion mild of lower L lid.   Neurological:      Mental Status: He is alert.     Diagnostic Test Results:  No results found for any visits on 05/29/21.    ASSESSMENT/PLAN:     1. Bacterial conjunctivitis of left eye  With worsening symptoms and mild ectropion of L lower lid - at risk for continued worsening without treatment - therefore Rx for ofloxacin gtts to be used 4x/day x 7 days.  Recommend eye exam if any double vision or vision loss occurring.  Follow up otherwise prn.  - ofloxacin (OCUFLOX) 0.3 % ophthalmic solution; Place 1-2 drops Into the left eye 4 times daily  Dispense: 10 mL; Refill: 0      Leyla Johnson NP  [facility] AND  [facility]

## 2021-05-30 ENCOUNTER — RECORDS - HEALTHEAST (OUTPATIENT)
Dept: ADMINISTRATIVE | Facility: CLINIC | Age: 86
End: 2021-05-30

## 2021-06-09 NOTE — PROGRESS NOTES
ASSESSMENT/PLAN:  1. Essential hypertension with goal blood pressure less than 140/90  This is so unfortunately continues to make mistakes with his blood pressure medicines.  Somehow we need to reinforce how we can make sure he is taking 3 medicines and never started taking another medicine that he just finds a bottle of sitting in his.  We will print off her med list every time he comes in and try and reinforce that that's what he should stick with Mr. other questions he should contact us.  - Basic Metabolic Panel    2. Diarrhea  In concern that this could be some sort of mild colitis associated with the statin.  At this time he has also been on antibiotics when this began and so we'll do C. diff and Giardia and culture and if negative, he may need colonoscopy for workup and decision and discussion about the statin.  The meantime he'll use Pepto-Bismol.  Use Imodium only as needed.  There weren't other dietary things were obviously wrong.  We'll have him use Metamucil as well.  - Giardia Detection, Stool  - Culture, Stool  - C. difficile Toxigenic by PCR    3. Cough  This is non-productive and no wheezing and doesn't sound anything like his previous coughs.  No treatment is indicated at this time.    Patient Instructions   Take Pepto Bismol 2-4 times per day as needed for diarrhea.     Only take Imodium if you are going to be traveling.    Stool cultures.       Return if symptoms worsen or fail to improve.    CHIEF COMPLAINT:  Chief Complaint   Patient presents with     diarrhea     cough       HISTORY OF PRESENT ILLNESS:  Wilmer Shankar is a 83 y.o. male presenting to the clinic today for a cough. He is accompanied by his wife to the clinic today. His cough began a couple of weeks ago when he had mucus discharge. He tried taking some Sudafed for his symptoms but his cough has remained.     Diarrhea: His diarrhea started a while back. He was taking Imodium at time and that seemed to help temporarily. Within the  "last week, every time he has a bowel movement it is watery. He has been having 2-3 bowel movements per day; one of these times is around 4-5 AM. He was instructed to take Metamucil instead of Imodium and he started that yesterday. He denies any fever or chills. He has noticed that he has been losing weight. He does not believe he has been eating anything different than he has in the past. He leaves for [place] in roughly 8 days and will be gone for three weeks.     Hypertension: He states that he restarted chlorthalidone on his own. He was informed in 7/08/2016 that he should be off of the medication. He states that he ended up finding the bottle of medication at his home and thought that maybe he should be taking the medication. His blood pressure is 126/68 in clinic today. He is positive that he is taking 10 mg of lisinopril daily, 75 mg of Plavix daily, and 12.5 mg of metoprolol daily. He is also taking 81 mg of aspirin daily.     REVIEW OF SYSTEMS:   He notices swelling in his left ankle by the end of the day; he has not been avoiding salt and salty foods.   Comprehensive review of systems negative except as noted above.    PFSH:  Social: He will be traveling to [place] for three weeks.   Reviewed as above.     TOBACCO USE:  History   Smoking Status     Never Smoker   Smokeless Tobacco     Not on file       VITALS:  Vitals:    03/08/17 1017   BP: 126/68   Pulse: 63   Temp: 97.7  F (36.5  C)     Wt Readings from Last 3 Encounters:   11/21/16 194 lb 12.8 oz (88.4 kg)   07/08/16 193 lb 11.2 oz (87.9 kg)   02/11/16 199 lb 8 oz (90.5 kg)     Estimated body mass index is 29.62 kg/(m^2) as calculated from the following:    Height as of 1/14/16: 5' 8\" (1.727 m).    Weight as of 11/21/16: 194 lb 12.8 oz (88.4 kg).    PHYSICAL EXAM:  General Appearance: Alert, cooperative, no distress, appears stated age. Does not sound congested.   Lungs: Clear to auscultation bilaterally, good air movement. Cough sounds non-productive. "   Heart: Regular rate and rhythm, S1 and S2 normal, no murmur or bruit. Heart rate right around 60. Pacemaker left chest.   Abdomen: Ventral hernia, benign.   Extremities: No pedal edema.   Psychiatric:  He has a normal mood and affect.     ADDITIONAL HISTORY SUMMARIZED (FROM OLD RECORDS OR HISTORY FROM SOMEONE OTHER THAN THE PATIENT OR ANOTHER HEALTHCARE PROVIDER) (2 TOTAL): Reviewed office note from 7/8/16; HTN, off of chlorthalidone BP well controlled.    DECISION TO OBTAIN EXTRA INFORMATION (OLD RECORDS REQUESTED OR HISTORY FROM ANOTHER PERSON OR ACCESSING CARE EVERYWHERE) (1 TOTAL): None.     RADIOLOGY TESTS SUMMARIZED OR ORDERED (XRAY/CT/MRI/DXA) (1 TOTAL): None.    LABS REVIEWED OR ORDERED (1 TOTAL): Ordered BMP, stool culture labs today.     MEDICINE TESTS SUMMARIZED OR ORDERED (EKG/ECHO/COLONOSCOPY/EGD) (1 TOTAL): None.    INDEPENDENT REVIEW OF EKG OR X-RAY (2 EACH): None.       The visit lasted a total of 12 minutes face to face with the patient. Over 50% of the time was spent counseling and educating the patient about diarrhea.    IIsabella, am scribing for and in the presence of, Dr. Plata.    IDr. Plata, personally performed the services described in this documentation, as scribed by Isabella Castaneda in my presence, and it is both accurate and complete.    MEDICATIONS:  Current Outpatient Prescriptions   Medication Sig Dispense Refill     alendronate (FOSAMAX) 70 MG tablet TAKE 1 TABLET EVERY 7 DAYS 12 tablet 0     aspirin 81 MG EC tablet Take 81 mg by mouth daily.       atorvastatin (LIPITOR) 40 MG tablet        cholecalciferol, vitamin D3, 1,000 unit tablet Take 1,000 Units by mouth daily.        clopidogrel (PLAVIX) 75 mg tablet Take 75 mg by mouth daily.       levothyroxine (SYNTHROID, LEVOTHROID) 125 MCG tablet Take 1 tablet (125 mcg total) by mouth Daily at 6:00 am. 90 tablet 3     lisinopril (PRINIVIL,ZESTRIL) 10 MG tablet Take 10 mg by mouth daily.       metoprolol succinate  (TOPROL-XL) 25 MG Take 0.5 tablets (12.5 mg total) by mouth daily. 45 tablet 3     sildenafil (VIAGRA) 50 MG tablet Take 2 tablets (100 mg total) by mouth daily as needed for erectile dysfunction. 10 tablet 3     No current facility-administered medications for this visit.        Total data points: 3

## 2021-06-14 NOTE — PROGRESS NOTES
Comes in complaining of 1 week worth of URI type symptoms of cough which is the most bothersome symptom.  Sinus congestion postnasal drip.  No significant purulence.  No fever or chills.  No shortness of breath or wheezing.  They have people coming to the place on Thursday of course.    Reviewed past medical history, allergies, meds,    Review of systems otherwise negative    Physical exam  Vital signs stable and afebrile.  Sounds a little congestion in the nose  No sinus pain with percussion  Cough sounds nonproductive  Lungs are clear with good air movement and no wheezing    Assessment and plan    Viral URI.  I did agree to write out a prescription that if he worsens over the next couple days before Thanksgiving and with the holiday that he could use Augmentin 875 twice daily.  I explained the symptoms that he would treat.  Try NyQuil at bedtime    Patient Instructions   Please note that if over the counter medications were taken off of your medication list, it is not because you are being asked to stop taking them.  does not want all of the over the counter medications on your medication list, as it bogs down the list.    Saline nasal spray 3-4 times per day  NyQuil at bedtime as needed  Start the antibiotic if you have fever chills things worsen by Thursday or pain or pressure in the sinuses

## 2021-06-14 NOTE — PROGRESS NOTES
Chief Complaint   Patient presents with     Flu Vaccine     Flu consent and contraindication forms are given/ signed/ reviewed and sent to medical records to scan.     Maryjo Kelly CMA [facility] 11/13/2017 12:02 PM

## 2021-07-09 DIAGNOSIS — D45 POLYCYTHEMIA VERA (H): ICD-10-CM

## 2021-07-09 LAB
BASOPHILS # BLD AUTO: 0.3 10E9/L (ref 0–0.2)
BASOPHILS NFR BLD AUTO: 2.3 %
DIFFERENTIAL METHOD BLD: ABNORMAL
EOSINOPHIL # BLD AUTO: 0.3 10E9/L (ref 0–0.7)
EOSINOPHIL NFR BLD AUTO: 2.8 %
ERYTHROCYTE [DISTWIDTH] IN BLOOD BY AUTOMATED COUNT: 19.9 % (ref 10–15)
HCT VFR BLD AUTO: 43.2 % (ref 40–53)
HGB BLD-MCNC: 11.7 G/DL (ref 13.3–17.7)
IMM GRANULOCYTES # BLD: 0.1 10E9/L (ref 0–0.4)
IMM GRANULOCYTES NFR BLD: 0.7 %
LYMPHOCYTES # BLD AUTO: 1.1 10E9/L (ref 0.8–5.3)
LYMPHOCYTES NFR BLD AUTO: 9.8 %
MCH RBC QN AUTO: 20.4 PG (ref 26.5–33)
MCHC RBC AUTO-ENTMCNC: 27.1 G/DL (ref 31.5–36.5)
MCV RBC AUTO: 75 FL (ref 78–100)
MONOCYTES # BLD AUTO: 0.5 10E9/L (ref 0–1.3)
MONOCYTES NFR BLD AUTO: 4.8 %
NEUTROPHILS # BLD AUTO: 8.7 10E9/L (ref 1.6–8.3)
NEUTROPHILS NFR BLD AUTO: 79.6 %
PLATELET # BLD AUTO: 300 10E9/L (ref 150–450)
RBC # BLD AUTO: 5.73 10E12/L (ref 4.4–5.9)
WBC # BLD AUTO: 10.9 10E9/L (ref 4–11)

## 2021-07-09 PROCEDURE — 85025 COMPLETE CBC W/AUTO DIFF WBC: CPT | Mod: ZL

## 2021-07-09 PROCEDURE — 36415 COLL VENOUS BLD VENIPUNCTURE: CPT | Mod: ZL

## 2021-08-04 ENCOUNTER — LAB (OUTPATIENT)
Dept: LAB | Facility: OTHER | Age: 86
End: 2021-08-04
Attending: INTERNAL MEDICINE
Payer: MEDICARE

## 2021-08-04 DIAGNOSIS — D45 PV (POLYCYTHEMIA VERA) (H): ICD-10-CM

## 2021-08-04 LAB
BASOPHILS # BLD AUTO: 0.2 10E3/UL (ref 0–0.2)
BASOPHILS NFR BLD AUTO: 2 %
EOSINOPHIL # BLD AUTO: 0.3 10E3/UL (ref 0–0.7)
EOSINOPHIL NFR BLD AUTO: 3 %
ERYTHROCYTE [DISTWIDTH] IN BLOOD BY AUTOMATED COUNT: 21.3 % (ref 10–15)
HCT VFR BLD AUTO: 45.1 % (ref 40–53)
HGB BLD-MCNC: 12.3 G/DL (ref 13.3–17.7)
IMM GRANULOCYTES # BLD: 0.1 10E3/UL
IMM GRANULOCYTES NFR BLD: 1 %
LYMPHOCYTES # BLD AUTO: 1.4 10E3/UL (ref 0.8–5.3)
LYMPHOCYTES NFR BLD AUTO: 12 %
MCH RBC QN AUTO: 21.1 PG (ref 26.5–33)
MCHC RBC AUTO-ENTMCNC: 27.3 G/DL (ref 31.5–36.5)
MCV RBC AUTO: 78 FL (ref 78–100)
MONOCYTES # BLD AUTO: 0.6 10E3/UL (ref 0–1.3)
MONOCYTES NFR BLD AUTO: 5 %
NEUTROPHILS # BLD AUTO: 9.1 10E3/UL (ref 1.6–8.3)
NEUTROPHILS NFR BLD AUTO: 77 %
NRBC # BLD AUTO: 0 10E3/UL
NRBC BLD AUTO-RTO: 0 /100
PLATELET # BLD AUTO: 290 10E3/UL (ref 150–450)
RBC # BLD AUTO: 5.82 10E6/UL (ref 4.4–5.9)
WBC # BLD AUTO: 11.7 10E3/UL (ref 4–11)

## 2021-08-04 PROCEDURE — 36415 COLL VENOUS BLD VENIPUNCTURE: CPT | Mod: ZL

## 2021-08-04 PROCEDURE — 85025 COMPLETE CBC W/AUTO DIFF WBC: CPT | Mod: ZL

## 2021-08-06 DIAGNOSIS — D45 PV (POLYCYTHEMIA VERA) (H): Primary | ICD-10-CM

## 2021-09-04 ENCOUNTER — HEALTH MAINTENANCE LETTER (OUTPATIENT)
Age: 86
End: 2021-09-04

## 2022-10-22 ENCOUNTER — HEALTH MAINTENANCE LETTER (OUTPATIENT)
Age: 87
End: 2022-10-22

## 2023-01-01 ENCOUNTER — DOCUMENTATION ONLY (OUTPATIENT)
Dept: OTHER | Facility: CLINIC | Age: 88
End: 2023-01-01
Payer: MEDICARE

## 2023-01-01 ENCOUNTER — HEALTH MAINTENANCE LETTER (OUTPATIENT)
Age: 88
End: 2023-01-01

## 2023-01-01 ENCOUNTER — LAB REQUISITION (OUTPATIENT)
Dept: LAB | Facility: CLINIC | Age: 88
End: 2023-01-01
Payer: MEDICARE

## 2023-01-01 DIAGNOSIS — D64.9 ANEMIA, UNSPECIFIED: ICD-10-CM

## 2023-01-01 DIAGNOSIS — N18.9 CHRONIC KIDNEY DISEASE, UNSPECIFIED: ICD-10-CM

## 2023-01-01 LAB
ANION GAP SERPL CALCULATED.3IONS-SCNC: 15 MMOL/L (ref 7–15)
BASOPHILS # BLD AUTO: 0.4 10E3/UL (ref 0–0.2)
BASOPHILS NFR BLD AUTO: 1 %
BUN SERPL-MCNC: 69 MG/DL (ref 8–23)
CALCIUM SERPL-MCNC: 8.6 MG/DL (ref 8.8–10.2)
CHLORIDE SERPL-SCNC: 105 MMOL/L (ref 98–107)
CREAT SERPL-MCNC: 3.81 MG/DL (ref 0.67–1.17)
DEPRECATED HCO3 PLAS-SCNC: 20 MMOL/L (ref 22–29)
EGFRCR SERPLBLD CKD-EPI 2021: 14 ML/MIN/1.73M2
EOSINOPHIL # BLD AUTO: 0.6 10E3/UL (ref 0–0.7)
EOSINOPHIL NFR BLD AUTO: 1 %
ERYTHROCYTE [DISTWIDTH] IN BLOOD BY AUTOMATED COUNT: 34.5 % (ref 10–15)
GLUCOSE SERPL-MCNC: 70 MG/DL (ref 70–99)
HCT VFR BLD AUTO: 35.3 % (ref 40–53)
HGB BLD-MCNC: 9.5 G/DL (ref 13.3–17.7)
IMM GRANULOCYTES # BLD: 1.9 10E3/UL
IMM GRANULOCYTES NFR BLD: 4 %
LYMPHOCYTES # BLD AUTO: 1.7 10E3/UL (ref 0.8–5.3)
LYMPHOCYTES NFR BLD AUTO: 4 %
MCH RBC QN AUTO: 24.7 PG (ref 26.5–33)
MCHC RBC AUTO-ENTMCNC: 26.9 G/DL (ref 31.5–36.5)
MCV RBC AUTO: 92 FL (ref 78–100)
MONOCYTES # BLD AUTO: 1.6 10E3/UL (ref 0–1.3)
MONOCYTES NFR BLD AUTO: 4 %
NEUTROPHILS # BLD AUTO: 38.2 10E3/UL (ref 1.6–8.3)
NEUTROPHILS NFR BLD AUTO: 86 %
NRBC # BLD AUTO: 0 10E3/UL
NRBC BLD AUTO-RTO: 0 /100
PLATELET # BLD AUTO: 665 10E3/UL (ref 150–450)
POTASSIUM SERPL-SCNC: 5.3 MMOL/L (ref 3.4–5.3)
RBC # BLD AUTO: 3.85 10E6/UL (ref 4.4–5.9)
SODIUM SERPL-SCNC: 140 MMOL/L (ref 135–145)
WBC # BLD AUTO: 44.3 10E3/UL (ref 4–11)

## 2023-01-01 PROCEDURE — 36415 COLL VENOUS BLD VENIPUNCTURE: CPT | Performed by: NURSE PRACTITIONER

## 2023-01-01 PROCEDURE — P9603 ONE-WAY ALLOW PRORATED MILES: HCPCS | Performed by: NURSE PRACTITIONER

## 2023-01-01 PROCEDURE — 80048 BASIC METABOLIC PNL TOTAL CA: CPT | Performed by: NURSE PRACTITIONER

## 2023-01-01 PROCEDURE — 85025 COMPLETE CBC W/AUTO DIFF WBC: CPT | Performed by: NURSE PRACTITIONER

## 2024-01-01 ENCOUNTER — APPOINTMENT (OUTPATIENT)
Dept: GENERAL RADIOLOGY | Facility: CLINIC | Age: 89
DRG: 871 | End: 2024-01-01
Attending: EMERGENCY MEDICINE
Payer: MEDICARE

## 2024-01-01 ENCOUNTER — HOSPITAL ENCOUNTER (INPATIENT)
Facility: CLINIC | Age: 89
LOS: 1 days | DRG: 871 | End: 2024-08-10
Attending: EMERGENCY MEDICINE | Admitting: STUDENT IN AN ORGANIZED HEALTH CARE EDUCATION/TRAINING PROGRAM
Payer: MEDICARE

## 2024-01-01 VITALS
WEIGHT: 165.34 LBS | OXYGEN SATURATION: 90 % | BODY MASS INDEX: 23.67 KG/M2 | SYSTOLIC BLOOD PRESSURE: 103 MMHG | HEART RATE: 118 BPM | RESPIRATION RATE: 22 BRPM | HEIGHT: 70 IN | DIASTOLIC BLOOD PRESSURE: 71 MMHG | TEMPERATURE: 99.9 F

## 2024-01-01 DIAGNOSIS — J96.01 ACUTE RESPIRATORY FAILURE WITH HYPOXIA AND HYPERCAPNIA (H): ICD-10-CM

## 2024-01-01 DIAGNOSIS — J96.00 SEPSIS WITH ACUTE RESPIRATORY FAILURE WITHOUT SEPTIC SHOCK, DUE TO UNSPECIFIED ORGANISM, UNSPECIFIED WHETHER HYPOXIA OR HYPERCAPNIA PRESENT (H): ICD-10-CM

## 2024-01-01 DIAGNOSIS — J96.02 ACUTE RESPIRATORY FAILURE WITH HYPOXIA AND HYPERCAPNIA (H): ICD-10-CM

## 2024-01-01 DIAGNOSIS — A41.9 SEPSIS WITH ACUTE RESPIRATORY FAILURE WITHOUT SEPTIC SHOCK, DUE TO UNSPECIFIED ORGANISM, UNSPECIFIED WHETHER HYPOXIA OR HYPERCAPNIA PRESENT (H): ICD-10-CM

## 2024-01-01 DIAGNOSIS — R65.20 SEPSIS WITH ACUTE RESPIRATORY FAILURE WITHOUT SEPTIC SHOCK, DUE TO UNSPECIFIED ORGANISM, UNSPECIFIED WHETHER HYPOXIA OR HYPERCAPNIA PRESENT (H): ICD-10-CM

## 2024-01-01 LAB
ALBUMIN SERPL BCG-MCNC: 2.3 G/DL (ref 3.5–5.2)
ALBUMIN UR-MCNC: 100 MG/DL
ALLEN'S TEST: ABNORMAL
ALP SERPL-CCNC: 89 U/L (ref 40–150)
ALT SERPL W P-5'-P-CCNC: <5 U/L (ref 0–70)
ANION GAP SERPL CALCULATED.3IONS-SCNC: 16 MMOL/L (ref 7–15)
ANION GAP SERPL CALCULATED.3IONS-SCNC: 16 MMOL/L (ref 7–15)
APPEARANCE UR: ABNORMAL
AST SERPL W P-5'-P-CCNC: 19 U/L (ref 0–45)
BACTERIA #/AREA URNS HPF: ABNORMAL /HPF
BASE EXCESS BLDA CALC-SCNC: -13 MMOL/L (ref -3–3)
BASE EXCESS BLDA CALC-SCNC: -13.2 MMOL/L (ref -3–3)
BASE EXCESS BLDA CALC-SCNC: -13.7 MMOL/L (ref -3–3)
BASE EXCESS BLDA CALC-SCNC: -13.8 MMOL/L (ref -3–3)
BASE EXCESS BLDA CALC-SCNC: -14 MMOL/L (ref -3–3)
BASOPHILS # BLD MANUAL: 0 10E3/UL (ref 0–0.2)
BASOPHILS # BLD MANUAL: 0 10E3/UL (ref 0–0.2)
BASOPHILS NFR BLD MANUAL: 0 %
BASOPHILS NFR BLD MANUAL: 0 %
BILIRUB DIRECT SERPL-MCNC: <0.2 MG/DL (ref 0–0.3)
BILIRUB SERPL-MCNC: 0.4 MG/DL
BILIRUB UR QL STRIP: NEGATIVE
BUN SERPL-MCNC: 56.4 MG/DL (ref 8–23)
BUN SERPL-MCNC: 74.5 MG/DL (ref 8–23)
CA-I BLD-MCNC: 4.5 MG/DL (ref 4.4–5.2)
CA-I BLD-MCNC: <1.6 MG/DL (ref 4.4–5.2)
CALCIUM SERPL-MCNC: 5 MG/DL (ref 8.8–10.4)
CALCIUM SERPL-MCNC: 7.6 MG/DL (ref 8.8–10.4)
CHLORIDE SERPL-SCNC: 109 MMOL/L (ref 98–107)
CHLORIDE SERPL-SCNC: 120 MMOL/L (ref 98–107)
COHGB MFR BLD: 80.7 % (ref 95–96)
COHGB MFR BLD: 85.1 % (ref 95–96)
COHGB MFR BLD: 86.1 % (ref 95–96)
COHGB MFR BLD: 86.6 % (ref 95–96)
COHGB MFR BLD: 91.3 % (ref 95–96)
COLOR UR AUTO: ABNORMAL
CREAT SERPL-MCNC: 2.77 MG/DL (ref 0.67–1.17)
CREAT SERPL-MCNC: 4.12 MG/DL (ref 0.67–1.17)
D DIMER PPP FEU-MCNC: 2.1 UG/ML FEU (ref 0–0.5)
EGFRCR SERPLBLD CKD-EPI 2021: 13 ML/MIN/1.73M2
EGFRCR SERPLBLD CKD-EPI 2021: 21 ML/MIN/1.73M2
EOSINOPHIL # BLD MANUAL: 0 10E3/UL (ref 0–0.7)
EOSINOPHIL # BLD MANUAL: 0 10E3/UL (ref 0–0.7)
EOSINOPHIL NFR BLD MANUAL: 0 %
EOSINOPHIL NFR BLD MANUAL: 0 %
ERYTHROCYTE [DISTWIDTH] IN BLOOD BY AUTOMATED COUNT: 22.9 % (ref 10–15)
ERYTHROCYTE [DISTWIDTH] IN BLOOD BY AUTOMATED COUNT: 23.8 % (ref 10–15)
GLUCOSE SERPL-MCNC: 213 MG/DL (ref 70–99)
GLUCOSE SERPL-MCNC: 95 MG/DL (ref 70–99)
GLUCOSE UR STRIP-MCNC: 100 MG/DL
GRANULAR CAST: 42 /LPF
HCO3 BLD-SCNC: 16 MMOL/L (ref 21–28)
HCO3 BLD-SCNC: 17 MMOL/L (ref 21–28)
HCO3 BLD-SCNC: 17 MMOL/L (ref 21–28)
HCO3 BLDV-SCNC: 16 MMOL/L (ref 21–28)
HCO3 SERPL-SCNC: 15 MMOL/L (ref 22–29)
HCO3 SERPL-SCNC: 9 MMOL/L (ref 22–29)
HCT VFR BLD AUTO: 45.9 % (ref 40–53)
HCT VFR BLD AUTO: 52.4 % (ref 40–53)
HGB BLD-MCNC: 11.9 G/DL (ref 13.3–17.7)
HGB BLD-MCNC: 13.7 G/DL (ref 13.3–17.7)
HGB UR QL STRIP: ABNORMAL
HOLD SPECIMEN: NORMAL
KETONES UR STRIP-MCNC: NEGATIVE MG/DL
LACTATE BLD-SCNC: 5 MMOL/L
LACTATE SERPL-SCNC: 3.7 MMOL/L (ref 0.7–2)
LEUKOCYTE ESTERASE UR QL STRIP: ABNORMAL
LIPASE SERPL-CCNC: 11 U/L (ref 13–60)
LYMPHOCYTES # BLD MANUAL: 1.3 10E3/UL (ref 0.8–5.3)
LYMPHOCYTES # BLD MANUAL: 3.4 10E3/UL (ref 0.8–5.3)
LYMPHOCYTES NFR BLD MANUAL: 1 %
LYMPHOCYTES NFR BLD MANUAL: 3 %
MAGNESIUM SERPL-MCNC: 1.4 MG/DL (ref 1.7–2.3)
MAGNESIUM SERPL-MCNC: 2.9 MG/DL (ref 1.7–2.3)
MCH RBC QN AUTO: 20.3 PG (ref 26.5–33)
MCH RBC QN AUTO: 20.7 PG (ref 26.5–33)
MCHC RBC AUTO-ENTMCNC: 25.9 G/DL (ref 31.5–36.5)
MCHC RBC AUTO-ENTMCNC: 26.1 G/DL (ref 31.5–36.5)
MCV RBC AUTO: 78 FL (ref 78–100)
MCV RBC AUTO: 79 FL (ref 78–100)
MONOCYTES # BLD MANUAL: 4.5 10E3/UL (ref 0–1.3)
MONOCYTES # BLD MANUAL: 6.5 10E3/UL (ref 0–1.3)
MONOCYTES NFR BLD MANUAL: 4 %
MONOCYTES NFR BLD MANUAL: 5 %
NEUTROPHILS # BLD MANUAL: 104.1 10E3/UL (ref 1.6–8.3)
NEUTROPHILS # BLD MANUAL: 121.3 10E3/UL (ref 1.6–8.3)
NEUTROPHILS NFR BLD MANUAL: 93 %
NEUTROPHILS NFR BLD MANUAL: 94 %
NITRATE UR QL: NEGATIVE
NRBC # BLD AUTO: 0 10E3/UL
NRBC # BLD AUTO: 0.1 10E3/UL
NRBC BLD AUTO-RTO: 0 /100
NRBC BLD AUTO-RTO: 0 /100
NT-PROBNP SERPL-MCNC: ABNORMAL PG/ML (ref 0–1800)
O2/TOTAL GAS SETTING VFR VENT: 100 %
PCO2 BLD: 50 MM HG (ref 35–45)
PCO2 BLD: 55 MM HG (ref 35–45)
PCO2 BLD: 56 MM HG (ref 35–45)
PCO2 BLD: 57 MM HG (ref 35–45)
PCO2 BLD: 57 MM HG (ref 35–45)
PCO2 BLDV: 37 MM HG (ref 40–50)
PH BLD: 7.07 [PH] (ref 7.35–7.45)
PH BLD: 7.08 [PH] (ref 7.35–7.45)
PH BLD: 7.12 [PH] (ref 7.35–7.45)
PH BLDV: 7.23 [PH] (ref 7.32–7.43)
PH UR STRIP: 6 [PH] (ref 5–7)
PHOSPHATE SERPL-MCNC: 7.7 MG/DL (ref 2.5–4.5)
PLAT MORPH BLD: ABNORMAL
PLAT MORPH BLD: ABNORMAL
PLATELET # BLD AUTO: 371 10E3/UL (ref 150–450)
PLATELET # BLD AUTO: 432 10E3/UL (ref 150–450)
PO2 BLD: 66 MM HG (ref 80–105)
PO2 BLD: 69 MM HG (ref 80–105)
PO2 BLD: 72 MM HG (ref 80–105)
PO2 BLD: 74 MM HG (ref 80–105)
PO2 BLD: 84 MM HG (ref 80–105)
PO2 BLDV: 23 MM HG (ref 25–47)
POTASSIUM SERPL-SCNC: 3.9 MMOL/L (ref 3.4–5.3)
POTASSIUM SERPL-SCNC: 6.2 MMOL/L (ref 3.4–5.3)
PROCALCITONIN SERPL IA-MCNC: 10.29 NG/ML
PROT SERPL-MCNC: 3.9 G/DL (ref 6.4–8.3)
RBC # BLD AUTO: 5.86 10E6/UL (ref 4.4–5.9)
RBC # BLD AUTO: 6.63 10E6/UL (ref 4.4–5.9)
RBC MORPH BLD: ABNORMAL
RBC MORPH BLD: ABNORMAL
RBC URINE: 53 /HPF
RETICS # AUTO: 0.07 10E6/UL (ref 0.03–0.1)
RETICS/RBC NFR AUTO: 1.2 % (ref 0.5–2)
SAO2 % BLDA: 79 % (ref 92–100)
SAO2 % BLDA: 84 % (ref 92–100)
SAO2 % BLDA: 85 % (ref 92–100)
SAO2 % BLDA: 85 % (ref 92–100)
SAO2 % BLDA: 90 % (ref 92–100)
SAO2 % BLDV: 31 % (ref 70–75)
SODIUM SERPL-SCNC: 140 MMOL/L (ref 135–145)
SODIUM SERPL-SCNC: 145 MMOL/L (ref 135–145)
SP GR UR STRIP: 1.01 (ref 1–1.03)
T4 FREE SERPL-MCNC: 0.77 NG/DL (ref 0.9–1.7)
TROPONIN T SERPL HS-MCNC: 72 NG/L
TSH SERPL DL<=0.005 MIU/L-ACNC: 9.3 UIU/ML (ref 0.3–4.2)
UROBILINOGEN UR STRIP-MCNC: NORMAL MG/DL
WBC # BLD AUTO: 111.9 10E3/UL (ref 4–11)
WBC # BLD AUTO: 129 10E3/UL (ref 4–11)
WBC CAST: 126 /LPF
WBC CLUMPS #/AREA URNS HPF: PRESENT /HPF
WBC URINE: >182 /HPF

## 2024-01-01 PROCEDURE — 83735 ASSAY OF MAGNESIUM: CPT | Performed by: STUDENT IN AN ORGANIZED HEALTH CARE EDUCATION/TRAINING PROGRAM

## 2024-01-01 PROCEDURE — 87040 BLOOD CULTURE FOR BACTERIA: CPT | Performed by: EMERGENCY MEDICINE

## 2024-01-01 PROCEDURE — 84484 ASSAY OF TROPONIN QUANT: CPT | Performed by: EMERGENCY MEDICINE

## 2024-01-01 PROCEDURE — 82040 ASSAY OF SERUM ALBUMIN: CPT | Performed by: EMERGENCY MEDICINE

## 2024-01-01 PROCEDURE — 83735 ASSAY OF MAGNESIUM: CPT | Performed by: EMERGENCY MEDICINE

## 2024-01-01 PROCEDURE — 120N000001 HC R&B MED SURG/OB

## 2024-01-01 PROCEDURE — 250N000011 HC RX IP 250 OP 636: Performed by: STUDENT IN AN ORGANIZED HEALTH CARE EDUCATION/TRAINING PROGRAM

## 2024-01-01 PROCEDURE — 85007 BL SMEAR W/DIFF WBC COUNT: CPT | Performed by: EMERGENCY MEDICINE

## 2024-01-01 PROCEDURE — 84439 ASSAY OF FREE THYROXINE: CPT | Performed by: EMERGENCY MEDICINE

## 2024-01-01 PROCEDURE — 36415 COLL VENOUS BLD VENIPUNCTURE: CPT | Performed by: STUDENT IN AN ORGANIZED HEALTH CARE EDUCATION/TRAINING PROGRAM

## 2024-01-01 PROCEDURE — 250N000011 HC RX IP 250 OP 636: Performed by: EMERGENCY MEDICINE

## 2024-01-01 PROCEDURE — 06HN33Z INSERTION OF INFUSION DEVICE INTO LEFT FEMORAL VEIN, PERCUTANEOUS APPROACH: ICD-10-PCS | Performed by: EMERGENCY MEDICINE

## 2024-01-01 PROCEDURE — 96368 THER/DIAG CONCURRENT INF: CPT

## 2024-01-01 PROCEDURE — 82330 ASSAY OF CALCIUM: CPT | Performed by: STUDENT IN AN ORGANIZED HEALTH CARE EDUCATION/TRAINING PROGRAM

## 2024-01-01 PROCEDURE — 96366 THER/PROPH/DIAG IV INF ADDON: CPT

## 2024-01-01 PROCEDURE — 999N000157 HC STATISTIC RCP TIME EA 10 MIN

## 2024-01-01 PROCEDURE — 3E043XZ INTRODUCTION OF VASOPRESSOR INTO CENTRAL VEIN, PERCUTANEOUS APPROACH: ICD-10-PCS | Performed by: EMERGENCY MEDICINE

## 2024-01-01 PROCEDURE — 96375 TX/PRO/DX INJ NEW DRUG ADDON: CPT

## 2024-01-01 PROCEDURE — 96367 TX/PROPH/DG ADDL SEQ IV INF: CPT

## 2024-01-01 PROCEDURE — 96365 THER/PROPH/DIAG IV INF INIT: CPT | Mod: 59

## 2024-01-01 PROCEDURE — 36556 INSERT NON-TUNNEL CV CATH: CPT

## 2024-01-01 PROCEDURE — 83605 ASSAY OF LACTIC ACID: CPT

## 2024-01-01 PROCEDURE — 99291 CRITICAL CARE FIRST HOUR: CPT | Mod: AI | Performed by: STUDENT IN AN ORGANIZED HEALTH CARE EDUCATION/TRAINING PROGRAM

## 2024-01-01 PROCEDURE — 85045 AUTOMATED RETICULOCYTE COUNT: CPT | Performed by: EMERGENCY MEDICINE

## 2024-01-01 PROCEDURE — 31500 INSERT EMERGENCY AIRWAY: CPT

## 2024-01-01 PROCEDURE — 250N000009 HC RX 250: Performed by: EMERGENCY MEDICINE

## 2024-01-01 PROCEDURE — 85014 HEMATOCRIT: CPT | Performed by: EMERGENCY MEDICINE

## 2024-01-01 PROCEDURE — 94002 VENT MGMT INPAT INIT DAY: CPT

## 2024-01-01 PROCEDURE — 93308 TTE F-UP OR LMTD: CPT

## 2024-01-01 PROCEDURE — 84145 PROCALCITONIN (PCT): CPT | Performed by: STUDENT IN AN ORGANIZED HEALTH CARE EDUCATION/TRAINING PROGRAM

## 2024-01-01 PROCEDURE — 84443 ASSAY THYROID STIM HORMONE: CPT | Performed by: EMERGENCY MEDICINE

## 2024-01-01 PROCEDURE — 36415 COLL VENOUS BLD VENIPUNCTURE: CPT | Performed by: EMERGENCY MEDICINE

## 2024-01-01 PROCEDURE — 82805 BLOOD GASES W/O2 SATURATION: CPT | Performed by: EMERGENCY MEDICINE

## 2024-01-01 PROCEDURE — 99291 CRITICAL CARE FIRST HOUR: CPT | Mod: 25

## 2024-01-01 PROCEDURE — 258N000003 HC RX IP 258 OP 636: Performed by: EMERGENCY MEDICINE

## 2024-01-01 PROCEDURE — 99292 CRITICAL CARE ADDL 30 MIN: CPT

## 2024-01-01 PROCEDURE — 250N000013 HC RX MED GY IP 250 OP 250 PS 637: Performed by: EMERGENCY MEDICINE

## 2024-01-01 PROCEDURE — 96376 TX/PRO/DX INJ SAME DRUG ADON: CPT

## 2024-01-01 PROCEDURE — 272N000007 HC KIT ART LINE INSERTION

## 2024-01-01 PROCEDURE — 81001 URINALYSIS AUTO W/SCOPE: CPT | Performed by: EMERGENCY MEDICINE

## 2024-01-01 PROCEDURE — 80048 BASIC METABOLIC PNL TOTAL CA: CPT | Performed by: STUDENT IN AN ORGANIZED HEALTH CARE EDUCATION/TRAINING PROGRAM

## 2024-01-01 PROCEDURE — 36415 COLL VENOUS BLD VENIPUNCTURE: CPT

## 2024-01-01 PROCEDURE — 85379 FIBRIN DEGRADATION QUANT: CPT | Performed by: EMERGENCY MEDICINE

## 2024-01-01 PROCEDURE — 83690 ASSAY OF LIPASE: CPT | Performed by: EMERGENCY MEDICINE

## 2024-01-01 PROCEDURE — 82330 ASSAY OF CALCIUM: CPT | Performed by: EMERGENCY MEDICINE

## 2024-01-01 PROCEDURE — 272N000017 HC KIT MONITOR CVP

## 2024-01-01 PROCEDURE — 84100 ASSAY OF PHOSPHORUS: CPT | Performed by: STUDENT IN AN ORGANIZED HEALTH CARE EDUCATION/TRAINING PROGRAM

## 2024-01-01 PROCEDURE — 83880 ASSAY OF NATRIURETIC PEPTIDE: CPT | Performed by: EMERGENCY MEDICINE

## 2024-01-01 PROCEDURE — 82805 BLOOD GASES W/O2 SATURATION: CPT | Performed by: STUDENT IN AN ORGANIZED HEALTH CARE EDUCATION/TRAINING PROGRAM

## 2024-01-01 PROCEDURE — 99207 PR NO BILLABLE SERVICE THIS VISIT: CPT | Performed by: INTERNAL MEDICINE

## 2024-01-01 PROCEDURE — 82803 BLOOD GASES ANY COMBINATION: CPT

## 2024-01-01 PROCEDURE — 36620 INSERTION CATHETER ARTERY: CPT

## 2024-01-01 PROCEDURE — 5A1935Z RESPIRATORY VENTILATION, LESS THAN 24 CONSECUTIVE HOURS: ICD-10-PCS | Performed by: EMERGENCY MEDICINE

## 2024-01-01 PROCEDURE — 999N000065 XR CHEST PORT 1 VIEW

## 2024-01-01 RX ORDER — NALOXONE HYDROCHLORIDE 0.4 MG/ML
0.2 INJECTION, SOLUTION INTRAMUSCULAR; INTRAVENOUS; SUBCUTANEOUS
Status: DISCONTINUED | OUTPATIENT
Start: 2024-01-01 | End: 2024-08-11 | Stop reason: HOSPADM

## 2024-01-01 RX ORDER — CARBOXYMETHYLCELLULOSE SODIUM 5 MG/ML
1-2 SOLUTION/ DROPS OPHTHALMIC
Status: DISCONTINUED | OUTPATIENT
Start: 2024-01-01 | End: 2024-08-11 | Stop reason: HOSPADM

## 2024-01-01 RX ORDER — FENTANYL CITRATE 50 UG/ML
50 INJECTION, SOLUTION INTRAMUSCULAR; INTRAVENOUS ONCE
Status: COMPLETED | OUTPATIENT
Start: 2024-01-01 | End: 2024-01-01

## 2024-01-01 RX ORDER — FENTANYL CITRATE-0.9 % NACL/PF 10 MCG/ML
200 PLASTIC BAG, INJECTION (ML) INTRAVENOUS ONCE
Status: COMPLETED | OUTPATIENT
Start: 2024-01-01 | End: 2024-01-01

## 2024-01-01 RX ORDER — NOREPINEPHRINE BITARTRATE 0.02 MG/ML
INJECTION, SOLUTION INTRAVENOUS
Status: DISCONTINUED
Start: 2024-01-01 | End: 2024-01-01 | Stop reason: HOSPADM

## 2024-01-01 RX ORDER — ACETAMINOPHEN 650 MG/1
650 SUPPOSITORY RECTAL ONCE
Status: COMPLETED | OUTPATIENT
Start: 2024-01-01 | End: 2024-01-01

## 2024-01-01 RX ORDER — SENNOSIDES 8.6 MG
1 TABLET ORAL 2 TIMES DAILY PRN
Status: DISCONTINUED | OUTPATIENT
Start: 2024-01-01 | End: 2024-08-11 | Stop reason: HOSPADM

## 2024-01-01 RX ORDER — BISACODYL 10 MG
10 SUPPOSITORY, RECTAL RECTAL
Status: DISCONTINUED | OUTPATIENT
Start: 2024-08-13 | End: 2024-08-11 | Stop reason: HOSPADM

## 2024-01-01 RX ORDER — LIDOCAINE 4 G/G
1 PATCH TOPICAL EVERY 24 HOURS
COMMUNITY

## 2024-01-01 RX ORDER — VANCOMYCIN 2 GRAM/500 ML IN 0.9 % SODIUM CHLORIDE INTRAVENOUS
25 ONCE
Status: COMPLETED | OUTPATIENT
Start: 2024-01-01 | End: 2024-01-01

## 2024-01-01 RX ORDER — LORAZEPAM 1 MG/1
1 TABLET ORAL
Status: DISCONTINUED | OUTPATIENT
Start: 2024-01-01 | End: 2024-08-11 | Stop reason: HOSPADM

## 2024-01-01 RX ORDER — CALCIUM GLUCONATE 20 MG/ML
2 INJECTION, SOLUTION INTRAVENOUS ONCE
Status: COMPLETED | OUTPATIENT
Start: 2024-01-01 | End: 2024-01-01

## 2024-01-01 RX ORDER — ACETAMINOPHEN 120 MG/1
120 SUPPOSITORY RECTAL ONCE
Status: DISCONTINUED | OUTPATIENT
Start: 2024-01-01 | End: 2024-01-01

## 2024-01-01 RX ORDER — HYDROMORPHONE HYDROCHLORIDE 1 MG/ML
0.5 INJECTION, SOLUTION INTRAMUSCULAR; INTRAVENOUS; SUBCUTANEOUS
Status: DISCONTINUED | OUTPATIENT
Start: 2024-01-01 | End: 2024-08-11 | Stop reason: HOSPADM

## 2024-01-01 RX ORDER — HYDROMORPHONE HYDROCHLORIDE 1 MG/ML
0.3 INJECTION, SOLUTION INTRAMUSCULAR; INTRAVENOUS; SUBCUTANEOUS
Status: DISCONTINUED | OUTPATIENT
Start: 2024-01-01 | End: 2024-08-11 | Stop reason: HOSPADM

## 2024-01-01 RX ORDER — CEFTRIAXONE 2 G/1
2 INJECTION, POWDER, FOR SOLUTION INTRAMUSCULAR; INTRAVENOUS ONCE
Status: COMPLETED | OUTPATIENT
Start: 2024-01-01 | End: 2024-01-01

## 2024-01-01 RX ORDER — MINERAL OIL/HYDROPHIL PETROLAT
OINTMENT (GRAM) TOPICAL
Status: DISCONTINUED | OUTPATIENT
Start: 2024-01-01 | End: 2024-08-11 | Stop reason: HOSPADM

## 2024-01-01 RX ORDER — NITROGLYCERIN 0.4 MG/1
0.4 TABLET SUBLINGUAL EVERY 5 MIN PRN
COMMUNITY

## 2024-01-01 RX ORDER — ATROPINE SULFATE 10 MG/ML
2 SOLUTION/ DROPS OPHTHALMIC EVERY 4 HOURS PRN
Status: DISCONTINUED | OUTPATIENT
Start: 2024-01-01 | End: 2024-08-11 | Stop reason: HOSPADM

## 2024-01-01 RX ORDER — HALOPERIDOL 5 MG/ML
2 INJECTION INTRAMUSCULAR
Status: DISCONTINUED | OUTPATIENT
Start: 2024-01-01 | End: 2024-08-11 | Stop reason: HOSPADM

## 2024-01-01 RX ORDER — ACETAMINOPHEN 325 MG/1
650 TABLET ORAL EVERY 6 HOURS PRN
Status: DISCONTINUED | OUTPATIENT
Start: 2024-01-01 | End: 2024-08-11 | Stop reason: HOSPADM

## 2024-01-01 RX ORDER — ONDANSETRON 4 MG/1
4 TABLET, ORALLY DISINTEGRATING ORAL EVERY 6 HOURS PRN
Status: DISCONTINUED | OUTPATIENT
Start: 2024-01-01 | End: 2024-08-11 | Stop reason: HOSPADM

## 2024-01-01 RX ORDER — HYDROMORPHONE HYDROCHLORIDE 1 MG/ML
1 SOLUTION ORAL
Status: DISCONTINUED | OUTPATIENT
Start: 2024-01-01 | End: 2024-08-11 | Stop reason: HOSPADM

## 2024-01-01 RX ORDER — ONDANSETRON 2 MG/ML
4 INJECTION INTRAMUSCULAR; INTRAVENOUS EVERY 6 HOURS PRN
Status: DISCONTINUED | OUTPATIENT
Start: 2024-01-01 | End: 2024-08-11 | Stop reason: HOSPADM

## 2024-01-01 RX ORDER — ACETAMINOPHEN 325 MG/1
325 TABLET ORAL EVERY 6 HOURS PRN
COMMUNITY

## 2024-01-01 RX ORDER — NALOXONE HYDROCHLORIDE 0.4 MG/ML
0.1 INJECTION, SOLUTION INTRAMUSCULAR; INTRAVENOUS; SUBCUTANEOUS
Status: DISCONTINUED | OUTPATIENT
Start: 2024-01-01 | End: 2024-08-11 | Stop reason: HOSPADM

## 2024-01-01 RX ORDER — HYDROMORPHONE HYDROCHLORIDE 2 MG/1
2 TABLET ORAL
Status: DISCONTINUED | OUTPATIENT
Start: 2024-01-01 | End: 2024-08-11 | Stop reason: HOSPADM

## 2024-01-01 RX ORDER — HYDROMORPHONE HYDROCHLORIDE 1 MG/ML
2 SOLUTION ORAL
Status: DISCONTINUED | OUTPATIENT
Start: 2024-01-01 | End: 2024-08-11 | Stop reason: HOSPADM

## 2024-01-01 RX ORDER — MULTIVITAMIN,THERAPEUTIC
1 TABLET ORAL DAILY
COMMUNITY

## 2024-01-01 RX ORDER — ALLOPURINOL 100 MG/1
100 TABLET ORAL DAILY
COMMUNITY

## 2024-01-01 RX ORDER — FENTANYL CITRATE 50 UG/ML
100 INJECTION, SOLUTION INTRAMUSCULAR; INTRAVENOUS ONCE
Status: COMPLETED | OUTPATIENT
Start: 2024-01-01 | End: 2024-01-01

## 2024-01-01 RX ORDER — MAGNESIUM SULFATE HEPTAHYDRATE 40 MG/ML
2 INJECTION, SOLUTION INTRAVENOUS ONCE
Status: COMPLETED | OUTPATIENT
Start: 2024-01-01 | End: 2024-01-01

## 2024-01-01 RX ORDER — ROPIVACAINE IN 0.9% SOD CHL/PF 0.1 %
.01-.125 PLASTIC BAG, INJECTION (ML) EPIDURAL CONTINUOUS
Status: DISCONTINUED | OUTPATIENT
Start: 2024-01-01 | End: 2024-08-11 | Stop reason: HOSPADM

## 2024-01-01 RX ORDER — BUDESONIDE 3 MG/1
6 CAPSULE, COATED PELLETS ORAL EVERY MORNING
COMMUNITY

## 2024-01-01 RX ORDER — ONDANSETRON 8 MG/1
8 TABLET, ORALLY DISINTEGRATING ORAL EVERY 8 HOURS PRN
COMMUNITY

## 2024-01-01 RX ORDER — LORAZEPAM 2 MG/ML
1 INJECTION INTRAMUSCULAR
Status: DISCONTINUED | OUTPATIENT
Start: 2024-01-01 | End: 2024-08-11 | Stop reason: HOSPADM

## 2024-01-01 RX ADMIN — NOREPINEPHRINE BITARTRATE 0.2 MCG/KG/MIN: 0.02 INJECTION, SOLUTION INTRAVENOUS at 19:14

## 2024-01-01 RX ADMIN — NOREPINEPHRINE BITARTRATE 0.2 MCG/KG/MIN: 0.02 INJECTION, SOLUTION INTRAVENOUS at 14:49

## 2024-01-01 RX ADMIN — LORAZEPAM 1 MG: 2 INJECTION INTRAMUSCULAR; INTRAVENOUS at 23:03

## 2024-01-01 RX ADMIN — Medication 200 MCG: at 14:45

## 2024-01-01 RX ADMIN — Medication 50 MCG/HR: at 15:22

## 2024-01-01 RX ADMIN — MIDAZOLAM 2 MG: 1 INJECTION INTRAMUSCULAR; INTRAVENOUS at 20:55

## 2024-01-01 RX ADMIN — MIDAZOLAM 4 MG: 1 INJECTION INTRAMUSCULAR; INTRAVENOUS at 22:52

## 2024-01-01 RX ADMIN — MIDAZOLAM 4 MG: 1 INJECTION INTRAMUSCULAR; INTRAVENOUS at 21:28

## 2024-01-01 RX ADMIN — HYDROMORPHONE HYDROCHLORIDE 0.5 MG: 1 INJECTION, SOLUTION INTRAMUSCULAR; INTRAVENOUS; SUBCUTANEOUS at 22:55

## 2024-01-01 RX ADMIN — MIDAZOLAM 4 MG: 1 INJECTION INTRAMUSCULAR; INTRAVENOUS at 14:45

## 2024-01-01 RX ADMIN — Medication 25 MCG: at 22:46

## 2024-01-01 RX ADMIN — Medication 25 MCG: at 21:46

## 2024-01-01 RX ADMIN — MIDAZOLAM 4 MG: 1 INJECTION INTRAMUSCULAR; INTRAVENOUS at 21:56

## 2024-01-01 RX ADMIN — ACETAMINOPHEN 650 MG: 650 SUPPOSITORY RECTAL at 21:23

## 2024-01-01 RX ADMIN — SODIUM CHLORIDE 1000 ML: 9 INJECTION, SOLUTION INTRAVENOUS at 14:42

## 2024-01-01 RX ADMIN — HYDROMORPHONE HYDROCHLORIDE 0.5 MG: 1 INJECTION, SOLUTION INTRAMUSCULAR; INTRAVENOUS; SUBCUTANEOUS at 23:08

## 2024-01-01 RX ADMIN — FENTANYL CITRATE 50 MCG: 0.05 INJECTION, SOLUTION INTRAMUSCULAR; INTRAVENOUS at 14:55

## 2024-01-01 RX ADMIN — ROCURONIUM BROMIDE 80 MG: 50 INJECTION, SOLUTION INTRAVENOUS at 14:42

## 2024-01-01 RX ADMIN — Medication 80 MG: at 14:54

## 2024-01-01 RX ADMIN — HYDROCORTISONE SODIUM SUCCINATE 100 MG: 100 INJECTION, POWDER, FOR SOLUTION INTRAMUSCULAR; INTRAVENOUS at 16:08

## 2024-01-01 RX ADMIN — Medication 200 MCG: at 14:44

## 2024-01-01 RX ADMIN — CEFTRIAXONE 2 G: 2 INJECTION, POWDER, FOR SOLUTION INTRAMUSCULAR; INTRAVENOUS at 15:44

## 2024-01-01 RX ADMIN — Medication 120 MG: at 14:40

## 2024-01-01 RX ADMIN — CALCIUM GLUCONATE 2 G: 20 INJECTION, SOLUTION INTRAVENOUS at 17:07

## 2024-01-01 RX ADMIN — SODIUM BICARBONATE: 84 INJECTION, SOLUTION INTRAVENOUS at 16:53

## 2024-01-01 RX ADMIN — NOREPINEPHRINE BITARTRATE 0.2 MCG/KG/MIN: 0.02 INJECTION, SOLUTION INTRAVENOUS at 15:15

## 2024-01-01 RX ADMIN — VANCOMYCIN HYDROCHLORIDE 2000 MG: 5 INJECTION, POWDER, LYOPHILIZED, FOR SOLUTION INTRAVENOUS at 15:46

## 2024-01-01 RX ADMIN — SODIUM CHLORIDE 1000 ML: 9 INJECTION, SOLUTION INTRAVENOUS at 14:45

## 2024-01-01 RX ADMIN — FENTANYL CITRATE 100 MCG: 50 INJECTION, SOLUTION INTRAMUSCULAR; INTRAVENOUS at 14:44

## 2024-01-01 RX ADMIN — MAGNESIUM SULFATE HEPTAHYDRATE 2 G: 40 INJECTION, SOLUTION INTRAVENOUS at 16:08

## 2024-01-01 RX ADMIN — MIDAZOLAM 4 MG: 1 INJECTION INTRAMUSCULAR; INTRAVENOUS at 22:33

## 2024-01-01 ASSESSMENT — ACTIVITIES OF DAILY LIVING (ADL)
ADLS_ACUITY_SCORE: 35

## 2024-01-01 ASSESSMENT — COLUMBIA-SUICIDE SEVERITY RATING SCALE - C-SSRS
1. IN THE PAST MONTH, HAVE YOU WISHED YOU WERE DEAD OR WISHED YOU COULD GO TO SLEEP AND NOT WAKE UP?: NO
2. HAVE YOU ACTUALLY HAD ANY THOUGHTS OF KILLING YOURSELF IN THE PAST MONTH?: NO
IS THE PATIENT NOT ABLE TO COMPLETE C-SSRS: UNRESPONSIVE
6. HAVE YOU EVER DONE ANYTHING, STARTED TO DO ANYTHING, OR PREPARED TO DO ANYTHING TO END YOUR LIFE?: NO

## 2024-08-10 PROBLEM — A41.9 SEPSIS WITH ACUTE RESPIRATORY FAILURE WITHOUT SEPTIC SHOCK, DUE TO UNSPECIFIED ORGANISM, UNSPECIFIED WHETHER HYPOXIA OR HYPERCAPNIA PRESENT (H): Status: ACTIVE | Noted: 2024-01-01

## 2024-08-10 PROBLEM — J96.02 ACUTE RESPIRATORY FAILURE WITH HYPOXIA AND HYPERCAPNIA (H): Status: ACTIVE | Noted: 2024-01-01

## 2024-08-10 PROBLEM — J96.01 ACUTE RESPIRATORY FAILURE WITH HYPOXIA AND HYPERCAPNIA (H): Status: ACTIVE | Noted: 2024-01-01

## 2024-08-10 PROBLEM — J96.00 SEPSIS WITH ACUTE RESPIRATORY FAILURE WITHOUT SEPTIC SHOCK, DUE TO UNSPECIFIED ORGANISM, UNSPECIFIED WHETHER HYPOXIA OR HYPERCAPNIA PRESENT (H): Status: ACTIVE | Noted: 2024-01-01

## 2024-08-10 PROBLEM — R65.20 SEPSIS WITH ACUTE RESPIRATORY FAILURE WITHOUT SEPTIC SHOCK, DUE TO UNSPECIFIED ORGANISM, UNSPECIFIED WHETHER HYPOXIA OR HYPERCAPNIA PRESENT (H): Status: ACTIVE | Noted: 2024-01-01

## 2024-08-10 NOTE — ED TRIAGE NOTES
Pt arrived w son from assisted living facilty. Son c/o shortness of breath- pt was unresponsive- except to pain. Pt 72% o2 RA upon arrival, unresponsive, placed oxymask- unable to achieve adequate oxygenation.  called to bedside @ 1420- RT paged.  Red team called.   Pt given oxygenation via BVM mask until ventilator was secured.      Triage Assessment (Adult)       Row Name 08/10/24 0897          Triage Assessment    Airway WDL WDL        Respiratory WDL    Respiratory WDL X;all     Rhythm/Pattern, Respiratory tachypneic;labored     Expansion/Accessory Muscles/Retractions accessory muscle use;abdominal muscle use;supraclavicular retractions     Cough Type --  rattling cough        Skin Circulation/Temperature WDL    Skin Circulation/Temperature WDL X  dusky/grey        Cardiac WDL    Cardiac WDL X  hypotension        Cognitive/Neuro/Behavioral WDL    Cognitive/Neuro/Behavioral WDL X  GCS 5

## 2024-08-10 NOTE — ED NOTES
Patient's wife at bedside as well as his daughter, son and daughter-in-law. MD at bedside discussing plan of care. Second daughter is on her way, eta is 9:30 PM this evening.

## 2024-08-10 NOTE — ED PROVIDER NOTES
Emergency Department Note      History of Present Illness     Chief Complaint   Shortness of Breath      HPI   Wilmer Shankar is a 89 year old male with history of polycythemia vera, CKD, and hypertension who presents from assisted living facility for worsening SOB since last night. Denies lightheadedness and dizziness. Denies lung issues. Denies vomiting and diarrhea. Patient is on a blood thinner and uses a defibrillator. He was brought in by his son.     Independent Historian   Family member as detailed above.    Review of External Notes       Past Medical History     Medical History and Problem List   Polycythemia vera  Stage 4 CKD  CAD  Hypertension  Hypothyroidism   Hyperlipidemia  Prostate cancer  Osteoporosis  Atherosclerosis of native coronary artery     Medications   Fosamax  Norvasc  Lipitor  Hygroton  Eliquis  Hydrea  Synthroid/Levothroid  Detrol LA  Toprol XL  Jardiance     Surgical History   Hernia repair  Right knee replacement  Total knee arthroplasty  Ankle surgery  Pelvis/hip joint surgery     Physical Exam     Patient Vitals for the past 24 hrs:   BP Temp Pulse Resp SpO2 Height Weight   08/10/24 2205 -- 99.9  F (37.7  C) 118 22 90 % -- --   08/10/24 2150 -- 100  F (37.8  C) 118 28 91 % -- --   08/10/24 2135 -- 99.9  F (37.7  C) 120 23 91 % -- --   08/10/24 2120 -- 100.2  F (37.9  C) 120 22 -- -- --   08/10/24 2110 -- 100.2  F (37.9  C) 117 11 92 % -- --   08/10/24 2105 -- 100.4  F (38  C) 69 10 91 % -- --   08/10/24 2055 -- 100.4  F (38  C) 67 22 94 % -- --   08/10/24 2050 -- 100.4  F (38  C) 68 18 -- -- --   08/10/24 2040 -- 100.2  F (37.9  C) 71 18 -- -- --   08/10/24 2035 -- 100.4  F (38  C) 71 22 -- -- --   08/10/24 2025 -- 100  F (37.8  C) 62 14 -- -- --   08/10/24 2020 -- 100  F (37.8  C) 64 14 -- -- --   08/10/24 2014 -- 100  F (37.8  C) 72 23 93 % -- --   08/10/24 2012 -- -- 62 28 93 % -- --   08/10/24 2010 -- 100  F (37.8  C) 77 12 -- -- --   08/10/24 2005 -- 99.9  F (37.7  C) 62 --  "-- -- --   08/10/24 1959 -- 99.7  F (37.6  C) 64 22 -- -- --   08/10/24 1955 -- 99.7  F (37.6  C) 62 17 93 % -- --   08/10/24 1945 -- 99.7  F (37.6  C) 70 15 93 % -- --   08/10/24 1944 -- -- 67 20 92 % -- --   08/10/24 1937 -- 99.1  F (37.3  C) 65 18 93 % -- --   08/10/24 1929 -- 98.8  F (37.1  C) 72 14 92 % -- --   08/10/24 1922 -- 97.9  F (36.6  C) 64 19 94 % -- --   08/10/24 1915 -- 99.3  F (37.4  C) 63 10 94 % -- --   08/10/24 1907 -- 98.1  F (36.7  C) 70 19 94 % -- --   08/10/24 1900 -- 98.4  F (36.9  C) 72 28 94 % -- --   08/10/24 1852 -- 97.5  F (36.4  C) 76 18 90 % -- --   08/10/24 1851 -- 99.1  F (37.3  C) 66 10 90 % -- --   08/10/24 1837 -- 98.8  F (37.1  C) 71 14 90 % -- --   08/10/24 1836 -- 98.8  F (37.1  C) 64 14 90 % -- --   08/10/24 1822 -- 99  F (37.2  C) 73 10 96 % -- --   08/10/24 1821 -- 98.8  F (37.1  C) 72 -- -- -- --   08/10/24 1813 -- 98.8  F (37.1  C) 84 18 95 % -- --   08/10/24 1806 -- 98.8  F (37.1  C) 82 13 -- -- --   08/10/24 1800 103/71 98.8  F (37.1  C) 65 -- 95 % -- --   08/10/24 1753 -- 98.8  F (37.1  C) 64 14 90 % -- --   08/10/24 1751 -- 98.8  F (37.1  C) 73 -- 94 % -- --   08/10/24 1745 -- 98.6  F (37  C) 72 11 90 % -- --   08/10/24 1743 -- 98.6  F (37  C) 62 18 97 % -- --   08/10/24 1740 110/70 98.6  F (37  C) 62 -- 90 % -- --   08/10/24 1735 -- 98.4  F (36.9  C) 64 -- 96 % -- --   08/10/24 1730 -- 98.6  F (37  C) 71 -- 97 % -- --   08/10/24 1715 -- 98.6  F (37  C) 63 -- 93 % -- --   08/10/24 1700 99/63 98.4  F (36.9  C) 75 10 90 % -- --   08/10/24 1658 -- -- 75 -- (!) 87 % -- --   08/10/24 1645 -- 98.2  F (36.8  C) 78 10 -- -- --   08/10/24 1643 -- 98.2  F (36.8  C) -- -- 90 % -- --   08/10/24 1640 105/58 98.2  F (36.8  C) 60 18 -- -- --   08/10/24 1630 -- 98.2  F (36.8  C) 60 24 (!) 88 % -- --   08/10/24 1615 106/62 98.1  F (36.7  C) 64 17 90 % -- --   08/10/24 1600 108/62 98.1  F (36.7  C) 60 22 90 % -- --   08/10/24 1543 -- -- -- -- -- 1.778 m (5' 10\") --   08/10/24 1510 " 117/62 -- 64 20 91 % -- --   08/10/24 1505 117/62 -- 64 20 91 % -- --   08/10/24 1453 -- -- 63 20 (!) 79 % -- --   08/10/24 1451 -- -- -- -- (!) 87 % -- --   08/10/24 1450 -- -- 60 -- (!) 86 % -- --   08/10/24 1448 115/62 -- 64 -- (!) 88 % -- --   08/10/24 1445 (!) 74/56 -- 60 -- (!) 79 % -- --   08/10/24 1443 (!) 70/49 -- 62 -- (!) 77 % -- --   08/10/24 1439 (!) 81/55 -- 61 -- (!) 47 % -- --   08/10/24 1438 90/57 -- 63 -- (!) 61 % -- --   08/10/24 1437 -- -- 67 -- (!) 82 % -- --   08/10/24 1436 -- -- 70 -- (!) 85 % -- --   08/10/24 1435 105/65 -- 64 -- (!) 82 % -- --   08/10/24 1434 -- -- -- -- (!) 83 % -- --   08/10/24 1433 -- -- -- -- (!) 81 % -- --   08/10/24 1432 -- -- -- -- (!) 73 % -- --   08/10/24 1431 -- -- -- -- (!) 76 % -- --   08/10/24 1430 -- -- -- -- (!) 75 % -- --   08/10/24 1429 -- -- -- -- (!) 72 % -- --   08/10/24 1428 -- -- -- -- (!) 73 % -- --   08/10/24 1427 -- -- -- -- (!) 73 % -- --   08/10/24 1426 -- -- -- -- (!) 74 % -- 75 kg (165 lb 5.5 oz)     Physical Exam     ill-appearing    HENT: Mucous membranes dry, thick whitish-yellow secretions present in the oropharynx, no rhinorrhea  Eyes: periorbital tissues and sclera normal   Neck: supple, no abnormal swelling  Lungs: Tachypneic, gasping respirations, accessory muscle use, tachypnea, coarse breath sounds diffusely  CV: Tachycardic, no m/r/g, ppi  Abd: soft, nontender, nondistended, no rebound/masses/guarding/hsm  Ext: no peripheral edema  Skin: warm, dry,   Neuro: Awake, able to follow simple commands, moving all extremities equally without obvious focal deficit        Diagnostics     Lab Results   Labs Ordered and Resulted from Time of ED Arrival to Time of ED Departure   ISTAT GASES LACTATE VENOUS POCT - Abnormal       Result Value    Lactic Acid POCT 5.0 (*)     Bicarbonate Venous POCT 16 (*)     O2 Sat, Venous POCT 31 (*)     pCO2 Venous POCT 37 (*)     pH Venous POCT 7.23 (*)     pO2 Venous POCT 23 (*)    LACTIC ACID WHOLE BLOOD -  Abnormal    Lactic Acid 3.7 (*)    BASIC METABOLIC PANEL - Abnormal    Sodium 145      Potassium 3.9      Chloride 120 (*)     Carbon Dioxide (CO2) 9 (*)     Anion Gap 16 (*)     Urea Nitrogen 56.4 (*)     Creatinine 2.77 (*)     GFR Estimate 21 (*)     Calcium 5.0 (*)     Glucose 95     HEPATIC FUNCTION PANEL - Abnormal    Protein Total 3.9 (*)     Albumin 2.3 (*)     Bilirubin Total 0.4      Alkaline Phosphatase 89      AST 19      ALT <5      Bilirubin Direct <0.20     LIPASE - Abnormal    Lipase 11 (*)    TROPONIN T, HIGH SENSITIVITY - Abnormal    Troponin T, High Sensitivity 72 (*)    MAGNESIUM - Abnormal    Magnesium 1.4 (*)    TSH WITH FREE T4 REFLEX - Abnormal    TSH 9.30 (*)    NT PROBNP INPATIENT - Abnormal    N terminal Pro BNP Inpatient 12,192 (*)    D DIMER QUANTITATIVE - Abnormal    D-Dimer Quantitative 2.10 (*)    CBC WITH PLATELETS AND DIFFERENTIAL - Abnormal    WBC Count 111.9 (*)     RBC Count 6.63 (*)     Hemoglobin 13.7      Hematocrit 52.4      MCV 79      MCH 20.7 (*)     MCHC 26.1 (*)     RDW 23.8 (*)     Platelet Count 371      NRBCs per 100 WBC 0      Absolute NRBCs 0.0     BLOOD GAS ARTERIAL - Abnormal    pH Arterial 7.12 (*)     pCO2 Arterial 50 (*)     pO2 Arterial 84      FIO2 100      Bicarbonate Arterial 16 (*)     Base Excess/Deficit Arterial -13.0 (*)     Keaton's Test Artline      Oxyhemoglobin Arterial 90 (*)     O2 Sat, Arterial 91.3 (*)    MANUAL DIFFERENTIAL - Abnormal    % Neutrophils 93      % Lymphocytes 3      % Monocytes 4      % Eosinophils 0      % Basophils 0      Absolute Neutrophils 104.1 (*)     Absolute Lymphocytes 3.4      Absolute Monocytes 4.5 (*)     Absolute Eosinophils 0.0      Absolute Basophils 0.0      RBC Morphology Confirmed RBC Indices      Platelet Assessment        Value: Automated Count Confirmed. Platelet morphology is normal.   T4 FREE - Abnormal    Free T4 0.77 (*)    BLOOD GAS ARTERIAL - Abnormal    pH Arterial 7.08 (*)     pCO2 Arterial 56 (*)      pO2 Arterial 72 (*)     FIO2 100      Bicarbonate Arterial 17 (*)     Base Excess/Deficit Arterial -13.7 (*)     Keaton's Test Artline      Oxyhemoglobin Arterial 84 (*)     O2 Sat, Arterial 85.1 (*)    CBC WITH PLATELETS AND DIFFERENTIAL - Abnormal    WBC Count 129.0 (*)     RBC Count 5.86      Hemoglobin 11.9 (*)     Hematocrit 45.9      MCV 78      MCH 20.3 (*)     MCHC 25.9 (*)     RDW 22.9 (*)     Platelet Count 432      NRBCs per 100 WBC 0      Absolute NRBCs 0.1     MANUAL DIFFERENTIAL - Abnormal    % Neutrophils 94      % Lymphocytes 1      % Monocytes 5      % Eosinophils 0      % Basophils 0      Absolute Neutrophils 121.3 (*)     Absolute Lymphocytes 1.3      Absolute Monocytes 6.5 (*)     Absolute Eosinophils 0.0      Absolute Basophils 0.0      RBC Morphology Confirmed RBC Indices      Platelet Assessment Platelets Clumped (*)    BLOOD GAS ARTERIAL - Abnormal    pH Arterial 7.07 (*)     pCO2 Arterial 57 (*)     pO2 Arterial 66 (*)     FIO2 100      Bicarbonate Arterial 16 (*)     Base Excess/Deficit Arterial -14.0 (*)     Keaton's Test Artline      Oxyhemoglobin Arterial 79 (*)     O2 Sat, Arterial 80.7 (*)    IONIZED CALCIUM - Abnormal    Calcium Ionized Whole Blood <1.6 (*)    BLOOD GAS ARTERIAL - Abnormal    pH Arterial 7.08 (*)     pCO2 Arterial 55 (*)     pO2 Arterial 74 (*)     FIO2 100      Bicarbonate Arterial 16 (*)     Base Excess/Deficit Arterial -13.8 (*)     Keaton's Test Artline      Oxyhemoglobin Arterial 85 (*)     O2 Sat, Arterial 86.6 (*)    ROUTINE UA WITH MICROSCOPIC - Abnormal    Color Urine Orange (*)     Appearance Urine Cloudy (*)     Glucose Urine 100 (*)     Bilirubin Urine Negative      Ketones Urine Negative      Specific Gravity Urine 1.013      Blood Urine Moderate (*)     pH Urine 6.0      Protein Albumin Urine 100 (*)     Urobilinogen Urine Normal      Nitrite Urine Negative      Leukocyte Esterase Urine Large (*)     Bacteria Urine Moderate (*)     WBC Clumps Urine Present  (*)     RBC Urine 53 (*)     WBC Urine >182 (*)     WBC Casts Urine 126 (*)     Granular Casts Urine 42 (*)    BASIC METABOLIC PANEL - Abnormal    Sodium 140      Potassium 6.2 (*)     Chloride 109 (*)     Carbon Dioxide (CO2) 15 (*)     Anion Gap 16 (*)     Urea Nitrogen 74.5 (*)     Creatinine 4.12 (*)     GFR Estimate 13 (*)     Calcium 7.6 (*)     Glucose 213 (*)    MAGNESIUM - Abnormal    Magnesium 2.9 (*)    PHOSPHORUS - Abnormal    Phosphorus 7.7 (*)    PROCALCITONIN - Abnormal    Procalcitonin 10.29 (*)    BLOOD GAS ARTERIAL - Abnormal    pH Arterial 7.08 (*)     pCO2 Arterial 57 (*)     pO2 Arterial 69 (*)     FIO2 100      Bicarbonate Arterial 17 (*)     Base Excess/Deficit Arterial -13.2 (*)     Keaton's Test Artline      Oxyhemoglobin Arterial 85 (*)     O2 Sat, Arterial 86.1 (*)    RETICULOCYTE COUNT - Normal    % Reticulocyte 1.2      Absolute Reticulocyte 0.070     IONIZED CALCIUM - Normal    Calcium Ionized Whole Blood 4.5     MORPHOLOGY TRACKING   PARATHYROID HORMONE INTACT   BLOOD MORPHOLOGY PATHOLOGIST REVIEW   BLOOD CULTURE   LAB BLOOD MORPHOLOGY PATHOLOGIST REVIEW       Imaging   POC US ECHO LIMITED   Final Result   PROCEDURE NOTE --> Emergency Bedside Ultrasound      Procedure Name: Limited Bedside CardioPulmonary Ultrasound      Preformed by: Devan García MD      Indication - Chest Pain, Shortness of Breath      Probe: Phased array probe    Curvilinear parobe      Windows -     PSLA    Subxyphoid    4 Chamber               Bilateral anterior and inferior lateral lung fields                IVC      Findings -    Contractility - normal    Pericardial Fluid - no significant effusion    Pleural effusion - no significant     Pneumothorax - no                B Lines - not significant burden of b lines                IVC - 21mm       Impression -normal left ventricular function, no significant pulmonary edema by sonography.  IVC 21 mm minimal collapsibility however patient is a positive pressure  ventilation.      Images saved to PACS protocol            XR Chest Port 1 View   Final Result   IMPRESSION: Endotracheal tube tip 4.5 cm from the edilson. Moderately extensive infiltrates throughout the left lung. Minimal patchy infiltrates on the right. A cardiac implantable electronic device is in place with lead(s) grossly intact. No abandoned    leads/wires or other unexpected metallic foreign bodies identified in the chest.              Independent Interpretation   Chest radiograph to my read shows adequate placement of the endotracheal tube, significant airspace disease in the left hemithorax, patchy opacities present on the right as well.    ED Course      Medications Administered   Medications   norepinephrine (LEVOPHED) 4 mg in  mL PERIPHERAL infusion (0 mcg/kg/min × 75 kg (Order-Specific) Intravenous Stopped 8/10/24 2249)   fentaNYL (SUBLIMAZE) infusion (0 mcg/hr Intravenous Stopped 8/10/24 2324)   fentaNYL (SUBLIMAZE) 50 mcg/mL bolus from pump (25 mcg Intravenous $Given 8/10/24 2246)   midazolam (VERSED) injection 2-4 mg (4 mg Intravenous $Given 8/10/24 2252)   sodium bicarbonate 150 mEq in D5W 1,000 mL infusion ( Intravenous Stopped 8/10/24 2249)   naloxone (NARCAN) injection 0.1 mg (has no administration in time range)   naloxone (NARCAN) injection 0.2 mg (has no administration in time range)   bisacodyl (DULCOLAX) suppository 10 mg (has no administration in time range)   sennosides (SENOKOT) tablet 1 tablet (has no administration in time range)   carboxymethylcellulose PF (REFRESH PLUS) 0.5 % ophthalmic solution 1-2 drop (has no administration in time range)   mineral oil-hydrophilic petrolatum (AQUAPHOR) (has no administration in time range)   HYDROmorphone (STANDARD CONC) (DILAUDID) oral solution 1 mg (has no administration in time range)     Or   HYDROmorphone (DILAUDID) half-tab 1 mg (has no administration in time range)   HYDROmorphone (STANDARD CONC) (DILAUDID) oral solution 2 mg (has no  administration in time range)     Or   HYDROmorphone (DILAUDID) tablet 2 mg (has no administration in time range)   HYDROmorphone (PF) (DILAUDID) injection 0.3 mg (has no administration in time range)   HYDROmorphone (PF) (DILAUDID) injection 0.5 mg (0.5 mg Intravenous $Given 8/10/24 2308)   acetaminophen (TYLENOL) tablet 650 mg (has no administration in time range)   LORazepam (ATIVAN) injection 1 mg (1 mg Intravenous $Given 8/10/24 2303)     Or   LORazepam (ATIVAN) tablet 1 mg ( Sublingual See Alternative 8/10/24 2303)   ondansetron (ZOFRAN ODT) ODT tab 4 mg (has no administration in time range)     Or   ondansetron (ZOFRAN) injection 4 mg (has no administration in time range)   haloperidol lactate (HALDOL) injection 2 mg (has no administration in time range)   atropine 1 % ophthalmic solution 2 drop (has no administration in time range)   ketamine (KETALAR) injection 120 mg (120 mg Intravenous $Given 8/10/24 1440)   rocuronium injection 80 mg (80 mg Intravenous $Given 8/10/24 1442)   midazolam (VERSED) injection 4 mg (4 mg Intravenous $Given 8/10/24 1445)   fentaNYL (PF) (SUBLIMAZE) injection 100 mcg (100 mcg Intravenous $Given 8/10/24 1444)   phenylephrine (JOS-SYNEPHRINE) injection 200 mcg (200 mcg Intravenous $Given 8/10/24 1445)   phenylephrine (JOS-SYNEPHRINE) injection 200 mcg (200 mcg Intravenous $Given 8/10/24 1444)   cefTRIAXone (ROCEPHIN) 2 g vial to attach to  ml bag for ADULTS or NS 50 ml bag for PEDS (0 g Intravenous Stopped 8/10/24 1622)   Vancomycin (VANCOCIN) 2,000 mg in 0.9% NaCl 500 mL intermittent infusion (0 mg Intravenous Stopped 8/10/24 1801)   ketamine (KETALAR) injection 80 mg (80 mg Intravenous $Given 8/10/24 1454)   fentaNYL (PF) (SUBLIMAZE) injection 50 mcg (50 mcg Intravenous $Given 8/10/24 1455)   sodium chloride 0.9% BOLUS 1,000 mL (0 mLs Intravenous Stopped 8/10/24 1613)   sodium chloride 0.9% BOLUS 1,000 mL (0 mLs Intravenous Stopped 8/10/24 1613)   hydrocortisone sodium  succinate PF (solu-CORTEF) injection 100 mg (100 mg Intravenous $Given 8/10/24 1608)   magnesium sulfate 2 g in 50 mL sterile water intermittent infusion (0 g Intravenous Stopped 8/10/24 1709)   calcium gluconate 2 g in  mL intermittent infusion (2 g Intravenous $Given 8/10/24 1707)   acetaminophen (TYLENOL) Suppository 650 mg (650 mg Rectal $Given 8/10/24 2123)       Procedures   -Intubation    Date/Time: 8/10/2024 11:47 PM    Performed by: Devan García MD  Authorized by: Devan García MD    Emergent condition/consent implied    Pre-procedure details:     Indications: airway protection, respiratory distress and respiratory failure      Patient status:  Altered mental status    Obstruction: none      Pharmacologic strategy: RSI      Induction agents:  Ketamine    Paralytics:  Rocuronium  Procedure details:     Preoxygenation: oxymask and NC.    CPR in progress: no      Number of attempts:  1  Successful intubation attempt details:     Intubation method:  Oral    Intubation technique: video assisted      Laryngoscope blade:  Mac 3    Bougie used: no      Tube size (mm):  7.5    Tube type:  Cuffed    Tube visualized through cords: yes    Placement assessment:     ETT at teeth/gumline (cm):  23    Tube secured with:  ETT puckett    Breath sounds:  Equal    Placement verification: chest rise, colorimetric ETCO2, CXR verification, direct visualization and equal breath sounds      CXR findings:  Appropriate position  Post-procedure details:     Procedure completion:  Patient tolerated the procedure well with no immediate complications    PROCEDURE    Patient Tolerance:  Patient tolerated the procedure well with no immediate complications  [facility]    -Arterial Line    Date/Time: 8/10/2024 11:48 PM    Performed by: Devan García MD  Authorized by: Devan García MD    Emergent condition/consent implied      INDICATIONS:   Indications: hemodynamic  monitoring and multiple ABGs      PRE-PROCEDURE DETAILS:   Skin preparation:  2% Chlorhexidine  PROCEDURE DETAILS:    Location:  L femoral  Needle gauge:  20 G  Placement technique:  Ultrasound guided and Seldinger  Number of attempts:  1  Transducer: waveform confirmed      POST PROCEDURE DETAILS:    Post-procedure:  Sutured  CMS:  Normal      PROCEDURE    Patient Tolerance:  Patient tolerated the procedure well with no immediate complications  [facility]    -Central Line    Date/Time: 8/10/2024 11:48 PM    Performed by: Devan García MD  Authorized by: Devan García MD    Emergent condition/consent implied      PRE-PROCEDURE DETAILS:     Hand hygiene: Hand hygiene performed prior to insertion      Sterile barrier technique: All elements of maximal sterile technique followed      Skin preparation:  2% chlorhexidine    Skin preparation agent: Skin preparation agent completely dried prior to procedure      PROCEDURE DETAILS:     Location:  L femoral    Patient position:  Flat    Procedural supplies:  Triple lumen    Catheter size:  6.5 Fr    Ultrasound guidance: yes      Sterile ultrasound techniques: Sterile gel and sterile probe covers were used      Number of attempts:  1    Successful placement: yes      POST PROCEDURE DETAILS:      Post-procedure:  Line sutured and dressing applied    Assessment:  Blood return through all ports and free fluid flow    PROCEDURE    Patient Tolerance:  Patient tolerated the procedure well with no immediate complications       Discussion of Management   Discussed with the admitting hospitalist    Discussed with on-call intensivist regarding difficulty with oxygenation and ventilation    ED Course   ED Course as of 08/10/24 2344   Sat Aug 10, 2024   1435 I obtained history and examined the patient as noted above.         Additional Documentation  None    Medical Decision Making / Diagnosis     CMS Diagnoses:The patient has signs of Septic  [facility]([id number],,,1)@  Sepsis ED evaluation   The patient has signs of septic shock as evidenced by:  1. Presence of Sepsis, AND  2. Lactic Acidosis with value greater than or equal to 4       Time septic shock diagnosis confirmed = 1453  08/10/24   as this was the time when chest radiograph returned showing suspicion for infection last platelet send 9.7 cm lobulated multiseptated fluid collection concerning for abscess    3 Hour Septic Shock Bundle Completion:  1. Initial Lactic Acid Result:   Recent Labs   Lab Test 08/10/24  1456 08/10/24  1438   LACT 3.7* 5.0*     2. Blood Cultures before Antibiotics: Yes  Note: Due to a national blood culture bottle shortage, reduced blood cultures may have been drawn on this patient.  3. Broad Spectrum Antibiotics Administered:  yes       Anti-infectives (From admission through now)      Start     Dose/Rate Route Frequency Ordered Stop    08/10/24 1530  Vancomycin (VANCOCIN) 2,000 mg in 0.9% NaCl 500 mL intermittent infusion         25 mg/kg × 75 kg (Order-Specific)  250 mL/hr over 2 Hours Intravenous ONCE 08/10/24 1450 08/10/24 1801    08/10/24 1455  cefTRIAXone (ROCEPHIN) 2 g vial to attach to  ml bag for ADULTS or NS 50 ml bag for PEDS         2 g  over 30 Minutes Intravenous ONCE 08/10/24 1450 08/10/24 1622            4. IF 30 mL/kg bolus criteria met based on:  -Lactate > 4  OR  -Initial Hypotension:  Definition:  2 low BP readings (SBP <90, MAP <65, or decrease > 40 from baseline due to infection) within 3 hrs of each other during the time period of 6 hrs before and 3 hrs  after time zero  THEN: Fluid volume administered in ED:  Full 30 mL/kg bolus given (see amount below).    BMI Readings from Last 1 Encounters:   08/10/24 23.72 kg/m      30 mL/kg fluids based on weight: 2,250 mL  30 mL/kg fluids based on IBW (must be >= 60 inches tall): 2,190 mL    Septic Shock reassessment:  Repeat Lactic Acid Level: ordered by reflex for 3 hours after initial lactic acid  collection.  Vasopressors started for Persistent Hypotension defined by the last 2 BP readings within the ONE HOUR following completion of the 30mL/kg bolus being low (SBP <90 or MAP <65).    I attest to having performed a repeat sepsis exam and assessment of perfusion at 1700 and the results demonstrate worsening perfusion.             NORMAN Shankar is a 89 year old male presenting critically ill with concerns of respiratory tract infectious symptoms, aspiration and airway obstruction.  Profoundly hypoxic and hypotensive on arrival.  Confirm with family was full code and he was promptly intubated.  Required aggressive resuscitative measures in terms of IV fluids, antibiotics, electrolyte replacement vasopressor support.    Complex cardiopulmonary dynamics with blood gases showing metabolic acidosis with concomitant respiratory acidosis and significant difficulty with oxygenation is expected based on his chest radiograph and meets ARDS criteria.    I discussed with the intensivist.  We try to optimize her ventilator settings both for ventilation as well as oxygenation.  Continued support with an isotonic bicarb infusion, electrolyte replacement and addition of vasopressors and previously given antibiotics.  Despite maximally aggressive care repeat BMP showed worsening renal failure, worsening respiratory status.  Vipin discussion with the family with the hospitalist and myself regarding prognosis here given his age comorbidities current critical illness and ultimately plan was to transition to comfort care once family member arrived from [place].    Number had arrived the all agreed to proceed with extubation and comfort measures.  Patient was appearing comfortable.  Time of death was .      Critical Care time was 135 minutes for this patient excluding procedures.        Disposition        Diagnosis     ICD-10-CM    1. Acute respiratory failure with hypoxia and hypercapnia (H)  J96.01      J96.02       2. Sepsis with acute respiratory failure without septic shock, due to unspecified organism, unspecified whether hypoxia or hypercapnia present (H)  A41.9     R65.20     J96.00            Discharge Medications   New Prescriptions    No medications on file         Scribe Disclosure:  Yvonne CASH, am serving as a scribe at 4:03 PM on 8/10/2024 to document services personally performed by Devan García, based on my observations and the provider's statements to me.        Devan García MD  08/10/24 [id number]       Devan García MD  08/10/24 [id number]

## 2024-08-10 NOTE — PROGRESS NOTES
RT Note:    Patient intubated by MD with 7.5 ETT, secured 23cm at teeth. Ventilated by MD with ambu until RT available with ventilator. Placement confirmed with positive color change on ETCO2 detector, bilateral breath sounds, and CXR. Placed on ventilator with settings of:    Vent Mode: CMV/AC  FiO2 (%): 100 %  Resp Rate (Set): 20 breaths/min  Tidal Volume (Set, mL): 450 mL  PEEP (cm H2O): 10 cmH2O  Resp: 20    Moved from room 11 to 3 on transport ventilator.

## 2024-08-10 NOTE — ED NOTES
DATE/TIME OF CALL RECEIVED FROM LAB:  08/10/24 at 4:21 PM   LAB TEST:  ph  LAB VALUE:  7.08  PROVIDER NOTIFIED?: Yes  PROVIDER NAME: walker  DATE/TIME LAB VALUE REPORTED TO PROVIDER: 4:21 PM    MECHANISM OF PROVIDER NOTIFICATION: Face-To-Face  PROVIDER RESPONSE:

## 2024-08-10 NOTE — PHARMACY-ADMISSION MEDICATION HISTORY
Pharmacist Admission Medication History    Admission medication history is complete. The information provided in this note is only as accurate as the sources available at the time of the update.    Information Source(s): Family member and Clinic records via in-person    Pertinent Information: none    Changes made to PTA medication list:  Added: Allopurinol 100mg, Budesonide DR 3mg, Jardiance 10mg, Zofran-ODT 8mg, Nitroglycerine 0.4mg SL, Tylenol 325mg, Multivitamins tabs, Lidocaine 4%  Deleted: Alendronate 70mg, Aspirin, Calcium-D, Fexofenadine tab, Ofloxacin drops, Sildenafil, Toprol XL 25mg  Changed:  Vit-D 5000 -> 1000IU  Levothyroxine 175mcg -> 150mcg    Allergies reviewed with patient and updates made in EHR: No    Medication History Completed By: Akhil Lowry RPH 8/10/2024 5:58 PM    PTA Med List   Medication Sig Last Dose    acetaminophen (TYLENOL) 325 MG tablet Take 325 mg by mouth every 6 hours as needed for mild pain     allopurinol (ZYLOPRIM) 100 MG tablet Take 100 mg by mouth daily     amLODIPine (NORVASC) 5 MG tablet Take 5 mg by mouth     ascorbic acid (VITAMIN C) 500 MG CPCR CR capsule Take 500 mg by mouth daily     atorvastatin (LIPITOR) 10 MG tablet Take 10 mg by mouth daily     budesonide (ENTOCORT EC) 3 MG EC capsule Take 6 mg by mouth every morning     Cholecalciferol (VITAMIN D3 PO) Take 1,000 Units by mouth daily     ELIQUIS ANTICOAGULANT 2.5 MG tablet Take 2.5 mg by mouth 2 times daily     empagliflozin (JARDIANCE) 10 MG TABS tablet Take 10 mg by mouth daily     hydroxyurea (HYDREA) 500 MG capsule TAKE 1 CAPSULE EVERY MONDAY, WEDNESDAY AND FRIDAY     levothyroxine (SYNTHROID/LEVOTHROID) 150 MCG tablet Take 150 mcg by mouth daily     Lidocaine (LIDOCARE) 4 % Patch Place 1 patch onto the skin every 24 hours To prevent lidocaine toxicity, patient should be patch free for 12 hrs daily.     multivitamin, therapeutic (THERA-VIT) TABS tablet Take 1 tablet by mouth daily     nitroGLYcerin  (NITROSTAT) 0.4 MG sublingual tablet Place 0.4 mg under the tongue every 5 minutes as needed for chest pain For chest pain place 1 tablet under the tongue every 5 minutes for 3 doses. If symptoms persist 5 minutes after 1st dose call 911.     ondansetron (ZOFRAN ODT) 8 MG ODT tab Take 8 mg by mouth every 8 hours as needed for nausea     sodium bicarbonate 650 MG tablet Take 650 mg by mouth     tolterodine ER (DETROL LA) 4 MG 24 hr capsule TAKE 1 CAPSULE DAILY

## 2024-08-11 ASSESSMENT — ACTIVITIES OF DAILY LIVING (ADL): ADLS_ACUITY_SCORE: 35

## 2024-08-11 NOTE — DISCHARGE SUMMARY
Patient was admitted by Dr. Hollis earlier this evening for respiratory failure and septic shock requiring 100% FiO2 with mechanical ventilation.  Dr. Hollis spoke with family and given his poor prognosis/age the patient was compassionately extubated after family arrived while he was still in the ER.  He  quickly after extubation and was pronounced by Dr. García.  I did not see the patient.  Please see Dr. Hollis's H&P below for further details.      H&P  from Dr. Marcelino Hollis  Assessment & Plan  Wilmer Shankar is a 89 year old male admitted on 8/10/2024.      He lives in a memory care with his wife who has dementia.  The patient himself does not have dementia but is wheelchair-bound, incontinent and completely dependent on care providers for ADLs.     He has history of polycythemia and  gets scheduled phlebotomies (last 1 on 2024.  And follows up with [facility].  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  He has chronic diarrhea with possible lymphocytic colitis.  He was diagnosed with COVID 3 weeks ago and had completely recovered from that.     He was observed to have little rattling in his chest 3 to 4 days ago.  Yesterday he had mild sore throat and his son and daughter-in-law [place].  This morning he appeared much sicker, confused and shortness of breath and was driven down to the ER.     Upon arrival to the ER, he was unresponsive with oxygen saturation of 72% on room air.Blood pressure was as low as 70/49 with heart rate of 60.  Temperature 98.1  F.     CBC showed WBC count of 129 with hemoglobin 11.9.  BUN/creatinine of 56.4/2.77 with calcium of 5 and ionized calcium of less than 1.6.  LFTs were normal.  TSH elevated at 9.3 with free T4 of 0.77.     Patient was intubated and placed on ventilator.  Initial VBG with pH/pCO2 to of 7.12/50 with HCO3 of 16.  Patient's x-ray showed extensive infiltrates throughout the left lung with minimal  patchy infiltrates on the right.  Patient had a difficulty oxygenating and ventilating but is finally maintaining his oxygen saturations of above 90% with FiO2 of 100% on the ventilator but continues to need high tidal volume and rate.        Acute hypoxic respiratory failure.  Likely secondary to community-acquired pneumonia.  Patient had COVID-19 3 weeks ago from which she had recovered since, it was not repeat tested as it might still be positive.  Will start on vancomycin, ceftriaxone and and azithromycin.    Patient has difficulty oxygenating and ventilating, defer ventilator management to critical care.     Septic shock.  Secondary community-acquired pneumonia, antibiotic as above.  Continue antibiotics empirically.     Severe leukocytosis.  WBC count of 129K.  With the patient having history of polycythemia, he likely also has a leukemia.  Peripheral blood film ordered, concerned that it might be acute leukemia which might be a life ending event for the patient.     Hypocalcemia.  Ionized calcium of less than 1.6.  Patient given calcium gluconate and recheck calcium is more than 4.  I think the initial calcium level was erroneous.     Hypomagnesemia.  Replace IV.     Goals of care.  I explained to the patient's family which includes son Zac, daughter-in-law and daughter that patient is critically ill.  Given very high white blood count he likely has leukemia.  Even if he does not have leukemia and given the severity of his illness he will need prolonged hospitalization and given his poor functional status prior to admission, he will likely experience deterioration in his functional status even if he makes through this illness.     Family understands the the patient's condition and agreed that we should not do CPR if there is cardiac arrest.  They do want another one of patient's daughter to come see him and may then decide on withdrawal and care.  Till then we will continue the current cares but no  escalation.  Likely compassionate extubation later.  AICD will need to be turned off.              Diet:  N.p.o.  DVT Prophylaxis: Will likely go comfort care  Maguire Catheter: PRESENT, indication:    Lines: PRESENT             Cardiac Monitoring: None  Code Status:  No CPR        Clinically Significant Risk Factors Present on Admission          # Hypocalcemia: Lowest iCa = 1.44 mg/dL in last 2 days, will monitor and replace as appropriate   # Hypomagnesemia: Lowest Mg = 1.4 mg/dL in last 2 days, will replace as needed   # Hypoalbuminemia: Lowest albumin = 2.3 g/dL at 8/10/2024  2:56 PM, will monitor as appropriate  # Drug Induced Coagulation Defect: home medication list includes an anticoagulant medication    # Hypertension: Noted on problem list   # Circulatory Shock: required vasopressors within past 24 hours                                      Disposition Plan     Medically Ready for Discharge: Patient is critically ill and expected to pass in the hospital.                 Marcelino Hollis MD  Hospitalist Service  [facility]  Securely message with [employer] (more info)  Text page via [employer] Paging/Directory      ______________________________________________________________________        Chief Complaint  Decreased level of consciousness.  Shortness of breath.     History is obtained from the patient's son and daughter-in-law at bedside           History of Present Illness  Wilmer Shankar is a 89 year old male admitted on 8/10/2024.      He lives in a memory care with his wife who has dementia.  The patient himself does not have dementia but is wheelchair-bound, incontinent and completely dependent on care providers for ADLs.     He has history of polycythemia and  gets scheduled phlebotomies (last 1 on 7/16/2024.  And follows up with [facility].  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  He has chronic diarrhea with possible lymphocytic colitis.  He was diagnosed with COVID 3  weeks ago and had completely recovered from that.     He was observed to have little rattling in his chest 3 to 4 days ago.  Yesterday he had mild sore throat and his son and daughter-in-law decided [place].  This morning he appeared much sicker, confused and shortness of breath and was driven down to the ER.     Upon arrival to the ER, he was unresponsive with oxygen saturation of 72% on room air.Blood pressure was as low as 70/49 with heart rate of 60.  Temperature 98.1  F.     CBC showed WBC count of 129 with hemoglobin 11.9.  BUN/creatinine of 56.4/2.77 with calcium of 5 and ionized calcium of less than 1.6.  LFTs were normal.  TSH elevated at 9.3 with free T4 of 0.77.     Patient was intubated and placed on ventilator.  Initial VBG with pH/pCO2 to of 7.12/50 with HCO3 of 16.  Patient's x-ray showed extensive infiltrates throughout the left lung with minimal patchy infiltrates on the right.  Patient had a difficulty oxygenating and ventilating but is finally maintaining his oxygen saturations of above 90% with FiO2 of 100% on the ventilator but continues to need high tidal volume and rate.              Past Medical History       Past Medical History:   Diagnosis Date    High blood cholesterol level      Hypertension        high cholesterol    Thyroid disease                 Past Surgical History        Past Surgical History:   Procedure Laterality Date    C UNLISTED PROCEDURE, ABDOMEN/PERITONEUM/OMENTUM         Description: Hernia Repair;  Recorded: 11/04/2008;    HERNIA REPAIR        ORTHOPEDIC SURGERY         right knee replacement    LA ARTHROPLASTY TIBIAL PLATEAU         Description: Knee Replacement;  Recorded: 11/04/2008;    C TOTAL KNEE ARTHROPLASTY         Description: Total Knee Arthroplasty;  Recorded: 11/12/2012;               Prior to Admission Medications  Prior to Admission Medications   Prescriptions Last Dose Informant Patient Reported? Taking?    Cholecalciferol (VITAMIN D3 PO)     Yes Yes   Sig: Take 1,000 Units by mouth daily   ELIQUIS ANTICOAGULANT 2.5 MG tablet     Yes Yes   Sig: Take 2.5 mg by mouth 2 times daily   Lidocaine (LIDOCARE) 4 % Patch     Yes Yes   Sig: Place 1 patch onto the skin every 24 hours To prevent lidocaine toxicity, patient should be patch free for 12 hrs daily.   acetaminophen (TYLENOL) 325 MG tablet     Yes Yes   Sig: Take 325 mg by mouth every 6 hours as needed for mild pain   allopurinol (ZYLOPRIM) 100 MG tablet     Yes Yes   Sig: Take 100 mg by mouth daily   amLODIPine (NORVASC) 5 MG tablet     Yes Yes   Sig: Take 5 mg by mouth   ascorbic acid (VITAMIN C) 500 MG CPCR CR capsule     Yes Yes   Sig: Take 500 mg by mouth daily   atorvastatin (LIPITOR) 10 MG tablet     Yes Yes   Sig: Take 10 mg by mouth daily   budesonide (ENTOCORT EC) 3 MG EC capsule     Yes Yes   Sig: Take 6 mg by mouth every morning   empagliflozin (JARDIANCE) 10 MG TABS tablet     Yes Yes   Sig: Take 10 mg by mouth daily   hydroxyurea (HYDREA) 500 MG capsule     Yes Yes   Sig: TAKE 1 CAPSULE EVERY MONDAY, WEDNESDAY AND FRIDAY   levothyroxine (SYNTHROID/LEVOTHROID) 150 MCG tablet     Yes Yes   Sig: Take 150 mcg by mouth daily   multivitamin, therapeutic (THERA-VIT) TABS tablet     Yes Yes   Sig: Take 1 tablet by mouth daily   nitroGLYcerin (NITROSTAT) 0.4 MG sublingual tablet     Yes Yes   Sig: Place 0.4 mg under the tongue every 5 minutes as needed for chest pain For chest pain place 1 tablet under the tongue every 5 minutes for 3 doses. If symptoms persist 5 minutes after 1st dose call 911.   ondansetron (ZOFRAN ODT) 8 MG ODT tab     Yes Yes   Sig: Take 8 mg by mouth every 8 hours as needed for nausea   sodium bicarbonate 650 MG tablet     Yes Yes   Sig: Take 650 mg by mouth   tolterodine ER (DETROL LA) 4 MG 24 hr capsule     Yes Yes   Sig: TAKE 1 CAPSULE DAILY      Facility-Administered Medications: None            Review of Systems  The 10 point Review of  Systems is negative other than noted in the HPI or here.             Physical Exam  Vital Signs: Temp: 99.1  F (37.3  C)   BP: 103/71 Pulse: 66   Resp: 10 SpO2: 90 % O2 Device: Mechanical Ventilator    Weight: 165 lbs 5.52 oz     General Appearance:  Intubated  Respiratory: Coarse breath sound bilateral  Cardiovascular: S1-S2 tachycardic  GI: Nontender  Skin: No rash  Other:  Unresponsive

## 2024-08-11 NOTE — DEATH PRONOUNCEMENT
MD DEATH PRONOUNCEMENT    Called to pronounce Wilmer Shankar dead.    Physical Exam: Unresponsive to noxious stimuli, Spontaneous respirations absent, Breath sounds absent, Carotid pulse absent, Heart sounds absent, and Corneal blink reflex absent    Patient was pronounced dead at 2324, August 10, 2024.    Preliminary Cause of Death: Septic shock (H)       Active Problems:    Acute respiratory failure with hypoxia and hypercapnia (H)    Sepsis with acute respiratory failure without septic shock, due to unspecified organism, unspecified whether hypoxia or hypercapnia present (H)       Infectious disease present?: YES    Communicable disease present? (examples: HIV, chicken pox, TB, Ebola, CJD) :  NO    Multi-drug resistant organism present? (example: MRSA): NO    Please consider an autopsy if any of the following exist:  NO Unexpected or unexplained death during or following any dental, medical, or surgical diagnostic treatment procedures.   NO Death of mother at or up to seven days after delivery.     NO All  and pediatric deaths.     NO Death where the cause is sufficiently obscure to delay completion of the death certificate.   NO Deaths in which autopsy would confirm a suspected illness/condition that would affect surviving family members or recipients of transplanted organs.     The following deaths must be reported to the 's Office:  NO A death that may be due entirely or in part to any factors other than natural disease (recent surgery, recent trauma, suspected abuse/neglect).   NO A death that may be an accident, suicide, or homicide.     NO Any sudden, unexpected death in which there is no prior history of significant heart disease or any other condition associated with sudden death.   NO A death under suspicious, unusual, or unexpected circumstances.    NO Any death which is apparently due to natural causes but in which the  does not have a personal physician familiar with the  patient s medical history, social, or environmental situation or the circumstances of the terminal event.   NO Any death apparently due to Sudden Infant Death Syndrome.     NO Deaths that occur during, in association with, or as consequences of a diagnostic, therapeutic, or anesthetic procedure.   NO Any death in which a fracture of a major bone has occurred within the past (6) six months.   NO A death of persons note seen by their physician within 120 days of demise.     NO Any death in which the  was an inmate of a public institution or was in the custody of Law Enforcement personnel.   NO  All unexpected deaths of children   NO Solid organ donors   NO Unidentified bodies   NO Deaths of persons whose bodies are to be cremated or otherwise disposed of so that the bodies will later be unavailable for examination;   NO Deaths unattended by a physician outside of a licensed healthcare facility or licensed residential hospice program   NO Deaths occurring within 24 hours of arrival to a health care facility if death is unexpected.    NO Deaths associated with the decedent s employment.   NO Deaths attributed to acts of terrorism.   NO Any death in which there is uncertainty as to whether it is a medical examiner s care should be discussed with the medical investigator.        Body disposition: Body released to the morgue.

## 2024-08-11 NOTE — PROGRESS NOTES
Patient's daughter Vanesa arrived and I met with her at bedside.  She agreed that patient not not want to be kept artificially alive under current circumstances and agreed with compassionate extubation.    Comfort care and extubation orders written.  Patient will need ICD to be turned off before extubation.  RN aware.    Marcelino Hollis

## 2024-08-11 NOTE — ED NOTES
at bedside completed patient last rites. All family members present at bedside, agreeable with POA decision to progress to comfort cares. RT at bedside assisting with extubation. Monitor alarms turned off.

## 2024-08-11 NOTE — H&P
[facility]    History and Physical - Hospitalist Service       Date of Admission:  8/10/2024    Assessment & Plan      Wilmer Shankar is a 89 year old male admitted on 8/10/2024.     He lives in a memory care with his wife who has dementia.  The patient himself does not have dementia but is wheelchair-bound, incontinent and completely dependent on care providers for ADLs.    He has history of polycythemia and  gets scheduled phlebotomies (last 1 on 7/16/2024.  And follows up with [place] [facility].  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  He has chronic diarrhea with possible lymphocytic colitis.  He was diagnosed with COVID 3 weeks ago and had completely recovered from that.    He was observed to have little rattling in his chest 3 to 4 days ago.  Yesterday he had mild sore throat and his son and daughter-in-law [place].  This morning he appeared much sicker, confused and shortness of breath and was driven down to the ER.    Upon arrival to the ER, he was unresponsive with oxygen saturation of 72% on room air.Blood pressure was as low as 70/49 with heart rate of 60.  Temperature 98.1  F.    CBC showed WBC count of 129 with hemoglobin 11.9.  BUN/creatinine of 56.4/2.77 with calcium of 5 and ionized calcium of less than 1.6.  LFTs were normal.  TSH elevated at 9.3 with free T4 of 0.77.    Patient was intubated and placed on ventilator.  Initial VBG with pH/pCO2 to of 7.12/50 with HCO3 of 16.  Patient's x-ray showed extensive infiltrates throughout the left lung with minimal patchy infiltrates on the right.  Patient had a difficulty oxygenating and ventilating but is finally maintaining his oxygen saturations of above 90% with FiO2 of 100% on the ventilator but continues to need high tidal volume and rate.      Acute hypoxic respiratory failure.  Likely secondary to community-acquired pneumonia.  Patient had COVID-19 3 weeks ago from which  she had recovered since, it was not repeat tested as it might still be positive.  Will start on vancomycin, ceftriaxone and and azithromycin.    Patient has difficulty oxygenating and ventilating, defer ventilator management to critical care.    Septic shock.  Secondary community-acquired pneumonia, antibiotic as above.  Continue antibiotics empirically.    Severe leukocytosis.  WBC count of 129K.  With the patient having history of polycythemia, he likely also has a leukemia.  Peripheral blood film ordered, concerned that it might be acute leukemia which might be a life ending event for the patient.    Hypocalcemia.  Ionized calcium of less than 1.6.  Patient given calcium gluconate and recheck calcium is more than 4.  I think the initial calcium level was erroneous.    Hypomagnesemia.  Replace IV.    Goals of care.  I explained to the patient's family which includes son Zac, daughter-in-law and daughter that patient is critically ill.  Given very high white blood count he likely has leukemia.  Even if he does not have leukemia and given the severity of his illness he will need prolonged hospitalization and given his poor functional status prior to admission, he will likely experience deterioration in his functional status even if he makes through this illness.    Family understands the the patient's condition and agreed that we should not do CPR if there is cardiac arrest.  They do want another one of patient's daughter to come see him and may then decide on withdrawal and care.  Till then we will continue the current cares but no escalation.  Likely compassionate extubation later.  AICD will need to be turned off.            Diet:  N.p.o.  DVT Prophylaxis: Will likely go comfort care  Maguire Catheter: PRESENT, indication:    Lines: PRESENT             Cardiac Monitoring: None  Code Status:  No CPR    Clinically Significant Risk Factors Present on Admission          # Hypocalcemia: Lowest iCa = 1.44 mg/dL in last  2 days, will monitor and replace as appropriate   # Hypomagnesemia: Lowest Mg = 1.4 mg/dL in last 2 days, will replace as needed   # Hypoalbuminemia: Lowest albumin = 2.3 g/dL at 8/10/2024  2:56 PM, will monitor as appropriate  # Drug Induced Coagulation Defect: home medication list includes an anticoagulant medication    # Hypertension: Noted on problem list   # Circulatory Shock: required vasopressors within past 24 hours                           Disposition Plan     Medically Ready for Discharge: Patient is critically ill and expected to pass in the hospital.           Marcelino Hollis MD  Hospitalist Service  [facility]  Securely message with [employer] (more info)  Text page via [facility] Paging/Directory     ______________________________________________________________________    Chief Complaint   Decreased level of consciousness.  Shortness of breath.    History is obtained from the patient's son and daughter-in-law at bedside    History of Present Illness   Wilmer Shankar is a 89 year old male admitted on 8/10/2024.     He lives in a memory care with his wife who has dementia.  The patient himself does not have dementia but is wheelchair-bound, incontinent and completely dependent on care providers for ADLs.    He has history of polycythemia and  gets scheduled phlebotomies (last 1 on 7/16/2024.  And follows up with [place] [facility].  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  He has chronic diarrhea with possible lymphocytic colitis.  He was diagnosed with COVID 3 weeks ago and had completely recovered from that.    He was observed to have little rattling in his chest 3 to 4 days ago.  Yesterday he had mild sore throat and his son and daughter-in-law decided [place].  This morning he appeared much sicker, confused and shortness of breath and was driven down to the ER.    Upon arrival to the ER, he was unresponsive with oxygen saturation of 72% on  room air.Blood pressure was as low as 70/49 with heart rate of 60.  Temperature 98.1  F.    CBC showed WBC count of 129 with hemoglobin 11.9.  BUN/creatinine of 56.4/2.77 with calcium of 5 and ionized calcium of less than 1.6.  LFTs were normal.  TSH elevated at 9.3 with free T4 of 0.77.    Patient was intubated and placed on ventilator.  Initial VBG with pH/pCO2 to of 7.12/50 with HCO3 of 16.  Patient's x-ray showed extensive infiltrates throughout the left lung with minimal patchy infiltrates on the right.  Patient had a difficulty oxygenating and ventilating but is finally maintaining his oxygen saturations of above 90% with FiO2 of 100% on the ventilator but continues to need high tidal volume and rate.      Past Medical History    Past Medical History:   Diagnosis Date    High blood cholesterol level     Hypertension      high cholesterol    Thyroid disease        Past Surgical History   Past Surgical History:   Procedure Laterality Date    C UNLISTED PROCEDURE, ABDOMEN/PERITONEUM/OMENTUM      Description: Hernia Repair;  Recorded: 11/04/2008;    HERNIA REPAIR      ORTHOPEDIC SURGERY      right knee replacement    [place] ARTHROPLASTY TIBIAL PLATEAU      Description: Knee Replacement;  Recorded: 11/04/2008;    [facility] TOTAL KNEE ARTHROPLASTY      Description: Total Knee Arthroplasty;  Recorded: 11/12/2012;       Prior to Admission Medications   Prior to Admission Medications   Prescriptions Last Dose Informant Patient Reported? Taking?   Cholecalciferol (VITAMIN D3 PO)   Yes Yes   Sig: Take 1,000 Units by mouth daily   ELIQUIS ANTICOAGULANT 2.5 MG tablet   Yes Yes   Sig: Take 2.5 mg by mouth 2 times daily   Lidocaine (LIDOCARE) 4 % Patch   Yes Yes   Sig: Place 1 patch onto the skin every 24 hours To prevent lidocaine toxicity, patient should be patch free for 12 hrs daily.   acetaminophen (TYLENOL) 325 MG tablet   Yes Yes   Sig: Take 325 mg by mouth every 6 hours as needed for mild pain   allopurinol (ZYLOPRIM) 100 MG  tablet   Yes Yes   Sig: Take 100 mg by mouth daily   amLODIPine (NORVASC) 5 MG tablet   Yes Yes   Sig: Take 5 mg by mouth   ascorbic acid (VITAMIN C) 500 MG CPCR CR capsule   Yes Yes   Sig: Take 500 mg by mouth daily   atorvastatin (LIPITOR) 10 MG tablet   Yes Yes   Sig: Take 10 mg by mouth daily   budesonide (ENTOCORT EC) 3 MG EC capsule   Yes Yes   Sig: Take 6 mg by mouth every morning   empagliflozin (JARDIANCE) 10 MG TABS tablet   Yes Yes   Sig: Take 10 mg by mouth daily   hydroxyurea (HYDREA) 500 MG capsule   Yes Yes   Sig: TAKE 1 CAPSULE EVERY MONDAY, WEDNESDAY AND FRIDAY   levothyroxine (SYNTHROID/LEVOTHROID) 150 MCG tablet   Yes Yes   Sig: Take 150 mcg by mouth daily   multivitamin, therapeutic (THERA-VIT) TABS tablet   Yes Yes   Sig: Take 1 tablet by mouth daily   nitroGLYcerin (NITROSTAT) 0.4 MG sublingual tablet   Yes Yes   Sig: Place 0.4 mg under the tongue every 5 minutes as needed for chest pain For chest pain place 1 tablet under the tongue every 5 minutes for 3 doses. If symptoms persist 5 minutes after 1st dose call 911.   ondansetron (ZOFRAN ODT) 8 MG ODT tab   Yes Yes   Sig: Take 8 mg by mouth every 8 hours as needed for nausea   sodium bicarbonate 650 MG tablet   Yes Yes   Sig: Take 650 mg by mouth   tolterodine ER (DETROL LA) 4 MG 24 hr capsule   Yes Yes   Sig: TAKE 1 CAPSULE DAILY      Facility-Administered Medications: None        Review of Systems    The 10 point Review of Systems is negative other than noted in the HPI or here.      Physical Exam   Vital Signs: Temp: 99.1  F (37.3  C)   BP: 103/71 Pulse: 66   Resp: 10 SpO2: 90 % O2 Device: Mechanical Ventilator    Weight: 165 lbs 5.52 oz    General Appearance: Intubated  Respiratory: Coarse breath sound bilateral  Cardiovascular: S1-S2 tachycardic  GI: Nontender  Skin: No rash  Other: Unresponsive    Medical Decision Making       Total time during critical care is 80 minutes       Data     I have personally reviewed the following data over  the past 24 hrs:    129.0 (HH)  \   11.9 (L)   / 432     140 109 (H) 74.5 (H) /  213 (H)   6.2 (HH) 15 (L) 4.12 (H) \     ALT: <5 AST: 19 AP: 89 TBILI: 0.4   ALB: 2.3 (L) TOT PROTEIN: 3.9 (L) LIPASE: 11 (L)     Trop: 72 (H) BNP: 12,192 (H)     TSH: 9.30 (H) T4: 0.77 (L) A1C: N/A     Procal: 10.29 (HH) CRP: N/A Lactic Acid: 3.7 (H)       INR:  N/A PTT:  N/A   D-dimer:  2.10 (H) Fibrinogen:  N/A     Ferritin:  N/A % Retic:  1.2 LDH:  N/A       Imaging results reviewed over the past 24 hrs:   Recent Results (from the past 24 hour(s))   XR Chest Port 1 View    Narrative    EXAM: XR CHEST PORTABLE 1 VIEW  LOCATION: [facility]  DATE: 08/10/2024    INDICATION: Intubation placement.  COMPARISON: None.      Impression    IMPRESSION: Endotracheal tube tip 4.5 cm from the edilson. Moderately extensive infiltrates throughout the left lung. Minimal patchy infiltrates on the right. A cardiac implantable electronic device is in place with lead(s) grossly intact. No abandoned   leads/wires or other unexpected metallic foreign bodies identified in the chest.

## 2024-08-15 LAB — BACTERIA BLD CULT: NO GROWTH
